# Patient Record
Sex: MALE | Race: WHITE | NOT HISPANIC OR LATINO | Employment: FULL TIME | URBAN - METROPOLITAN AREA
[De-identification: names, ages, dates, MRNs, and addresses within clinical notes are randomized per-mention and may not be internally consistent; named-entity substitution may affect disease eponyms.]

---

## 2018-04-27 ENCOUNTER — HOSPITAL ENCOUNTER (INPATIENT)
Facility: CLINIC | Age: 38
LOS: 2 days | Discharge: HOME | DRG: 392 | End: 2018-04-29
Attending: HOSPITALIST | Admitting: HOSPITALIST
Payer: COMMERCIAL

## 2018-04-27 ENCOUNTER — TRANSFERRED RECORDS (OUTPATIENT)
Dept: HEALTH INFORMATION MANAGEMENT | Facility: CLINIC | Age: 38
End: 2018-04-27

## 2018-04-27 DIAGNOSIS — K57.32 DIVERTICULITIS OF LARGE INTESTINE WITHOUT PERFORATION OR ABSCESS WITHOUT BLEEDING: Primary | ICD-10-CM

## 2018-04-27 PROCEDURE — 25800025 ZZH RX 258: Performed by: HOSPITALIST

## 2018-04-27 PROCEDURE — 25000128 H RX IP 250 OP 636: Performed by: HOSPITALIST

## 2018-04-27 PROCEDURE — 12000011 ZZH R&B MS OVERFLOW

## 2018-04-27 PROCEDURE — 99223 1ST HOSP IP/OBS HIGH 75: CPT | Mod: AI | Performed by: HOSPITALIST

## 2018-04-27 RX ORDER — ONDANSETRON 4 MG/1
4 TABLET, ORALLY DISINTEGRATING ORAL EVERY 6 HOURS PRN
Status: DISCONTINUED | OUTPATIENT
Start: 2018-04-27 | End: 2018-04-29 | Stop reason: HOSPADM

## 2018-04-27 RX ORDER — PROCHLORPERAZINE 25 MG
25 SUPPOSITORY, RECTAL RECTAL EVERY 12 HOURS PRN
Status: DISCONTINUED | OUTPATIENT
Start: 2018-04-27 | End: 2018-04-29 | Stop reason: HOSPADM

## 2018-04-27 RX ORDER — DEXTROSE MONOHYDRATE, SODIUM CHLORIDE, AND POTASSIUM CHLORIDE 50; 1.49; 4.5 G/1000ML; G/1000ML; G/1000ML
INJECTION, SOLUTION INTRAVENOUS CONTINUOUS
Status: DISCONTINUED | OUTPATIENT
Start: 2018-04-27 | End: 2018-04-29

## 2018-04-27 RX ORDER — POTASSIUM CHLORIDE 29.8 MG/ML
20 INJECTION INTRAVENOUS
Status: DISCONTINUED | OUTPATIENT
Start: 2018-04-27 | End: 2018-04-29 | Stop reason: HOSPADM

## 2018-04-27 RX ORDER — PROCHLORPERAZINE MALEATE 5 MG
10 TABLET ORAL EVERY 6 HOURS PRN
Status: DISCONTINUED | OUTPATIENT
Start: 2018-04-27 | End: 2018-04-29 | Stop reason: HOSPADM

## 2018-04-27 RX ORDER — ONDANSETRON 2 MG/ML
4 INJECTION INTRAMUSCULAR; INTRAVENOUS EVERY 6 HOURS PRN
Status: DISCONTINUED | OUTPATIENT
Start: 2018-04-27 | End: 2018-04-29 | Stop reason: HOSPADM

## 2018-04-27 RX ORDER — OXYCODONE HYDROCHLORIDE 5 MG/1
5-10 TABLET ORAL
Status: DISCONTINUED | OUTPATIENT
Start: 2018-04-27 | End: 2018-04-29 | Stop reason: HOSPADM

## 2018-04-27 RX ORDER — HYDROMORPHONE HYDROCHLORIDE 1 MG/ML
.3-.5 INJECTION, SOLUTION INTRAMUSCULAR; INTRAVENOUS; SUBCUTANEOUS
Status: DISCONTINUED | OUTPATIENT
Start: 2018-04-27 | End: 2018-04-29

## 2018-04-27 RX ORDER — POTASSIUM CHLORIDE 7.45 MG/ML
10 INJECTION INTRAVENOUS
Status: DISCONTINUED | OUTPATIENT
Start: 2018-04-27 | End: 2018-04-29 | Stop reason: HOSPADM

## 2018-04-27 RX ORDER — CIPROFLOXACIN 500 MG/1
500 TABLET, FILM COATED ORAL 2 TIMES DAILY
Status: ON HOLD | COMMUNITY
Start: 2018-04-24 | End: 2018-04-29

## 2018-04-27 RX ORDER — NALOXONE HYDROCHLORIDE 0.4 MG/ML
.1-.4 INJECTION, SOLUTION INTRAMUSCULAR; INTRAVENOUS; SUBCUTANEOUS
Status: DISCONTINUED | OUTPATIENT
Start: 2018-04-27 | End: 2018-04-29 | Stop reason: HOSPADM

## 2018-04-27 RX ORDER — METRONIDAZOLE 500 MG/1
500 TABLET ORAL 2 TIMES DAILY
Status: ON HOLD | COMMUNITY
Start: 2018-04-24 | End: 2018-04-29

## 2018-04-27 RX ORDER — POTASSIUM CHLORIDE 1500 MG/1
20-40 TABLET, EXTENDED RELEASE ORAL
Status: DISCONTINUED | OUTPATIENT
Start: 2018-04-27 | End: 2018-04-29 | Stop reason: HOSPADM

## 2018-04-27 RX ORDER — POTASSIUM CHLORIDE 1.5 G/1.58G
20-40 POWDER, FOR SOLUTION ORAL
Status: DISCONTINUED | OUTPATIENT
Start: 2018-04-27 | End: 2018-04-29 | Stop reason: HOSPADM

## 2018-04-27 RX ORDER — MAGNESIUM SULFATE HEPTAHYDRATE 40 MG/ML
4 INJECTION, SOLUTION INTRAVENOUS EVERY 4 HOURS PRN
Status: DISCONTINUED | OUTPATIENT
Start: 2018-04-27 | End: 2018-04-29 | Stop reason: HOSPADM

## 2018-04-27 RX ORDER — POTASSIUM CL/LIDO/0.9 % NACL 10MEQ/0.1L
10 INTRAVENOUS SOLUTION, PIGGYBACK (ML) INTRAVENOUS
Status: DISCONTINUED | OUTPATIENT
Start: 2018-04-27 | End: 2018-04-29 | Stop reason: HOSPADM

## 2018-04-27 RX ORDER — ACETAMINOPHEN 325 MG/1
650 TABLET ORAL EVERY 4 HOURS PRN
Status: DISCONTINUED | OUTPATIENT
Start: 2018-04-27 | End: 2018-04-29 | Stop reason: HOSPADM

## 2018-04-27 RX ADMIN — TAZOBACTAM SODIUM AND PIPERACILLIN SODIUM 3.38 G: 375; 3 INJECTION, SOLUTION INTRAVENOUS at 20:59

## 2018-04-27 RX ADMIN — POTASSIUM CHLORIDE, DEXTROSE MONOHYDRATE AND SODIUM CHLORIDE: 150; 5; 450 INJECTION, SOLUTION INTRAVENOUS at 20:59

## 2018-04-27 RX ADMIN — HYDROMORPHONE HYDROCHLORIDE 0.5 MG: 1 INJECTION, SOLUTION INTRAMUSCULAR; INTRAVENOUS; SUBCUTANEOUS at 23:14

## 2018-04-27 RX ADMIN — HYDROMORPHONE HYDROCHLORIDE 0.5 MG: 1 INJECTION, SOLUTION INTRAMUSCULAR; INTRAVENOUS; SUBCUTANEOUS at 21:14

## 2018-04-27 ASSESSMENT — ACTIVITIES OF DAILY LIVING (ADL)
FALL_HISTORY_WITHIN_LAST_SIX_MONTHS: NO
AMBULATION: 0-->INDEPENDENT
DRESS: 0-->INDEPENDENT
BATHING: 0-->INDEPENDENT
COGNITION: 0 - NO COGNITION ISSUES REPORTED
RETIRED_EATING: 0-->INDEPENDENT
SWALLOWING: 0-->SWALLOWS FOODS/LIQUIDS WITHOUT DIFFICULTY
TOILETING: 0-->INDEPENDENT
TRANSFERRING: 0-->INDEPENDENT
RETIRED_COMMUNICATION: 0-->UNDERSTANDS/COMMUNICATES WITHOUT DIFFICULTY

## 2018-04-27 NOTE — IP AVS SNAPSHOT
[facility]    [address]    [facility] [address]    Phone:  [phone]    Fax:  [phone]                                       After Visit Summary   4/27/2018    Pardeep Waite    MRN: [id number]           After Visit Summary Signature Page     I have received my discharge instructions, and my questions have been answered. I have discussed any challenges I see with this plan with the nurse or doctor.    ..........................................................................................................................................  Patient/Patient Representative Signature      ..........................................................................................................................................  Patient Representative Print Name and Relationship to Patient    ..................................................               ................................................  Date                                            Time    ..........................................................................................................................................  Reviewed by Signature/Title    ...................................................              ..............................................  Date                                                            Time

## 2018-04-27 NOTE — IP AVS SNAPSHOT
MRN:[id number]                      After Visit Summary   4/27/2018    Pardeep Waite    MRN: [id number]           Thank you!     Thank you for choosing [facility] for your care. Our goal is always to provide you with excellent care. Hearing back from our patients is one way we can continue to improve our services. Please take a few minutes to complete the written survey that you may receive in the mail after you visit. If you would like to speak to someone directly about your visit please contact Patient Relations at [phone]. Thank you!          Patient Information     Date Of Birth          1980        Designated Caregiver       Most Recent Value    Caregiver    Will someone help with your care after discharge? no      About your hospital stay     You were admitted on:  April 27, 2018 You last received care in the:  [facility]    You were discharged on:  April 29, 2018       Who to Call     For medical emergencies, please call 911.  For non-urgent questions about your medical care, please call your primary care provider or clinic, None          Attending Provider     Provider Specialty    Clyde Alvarez, DO Internal Medicine       Primary Care Provider Fax #    Physician No Ref-Primary [phone]      After Care Instructions     Activity       Your activity upon discharge: activity as tolerated and no driving while on analgesics            Diet       Follow this diet upon discharge: Orders Placed This Encounter      Low Fiber Diet                  Follow-up Appointments     Follow-up and recommended labs and tests        Follow up with primary care provider, Physician No Ref-Primary, within 7 days to evaluate medication change and for hospital follow- up.  No follow up labs or test are needed.  Follow up with colorectal surgery in 6-8 weeks to make arrangements for colonoscopy.   The patient was provided with a limited supply of oral narcotic  "medication, as it was indicated by their medical condition.  The patient was instructed not to take the pain medication above the prescribed dose and frequency due to the potential for addiction, respiratory depression, and even death. The patient expressed understanding of these instructions, is willing to accept these inherent risks, and verbally contracted not to misuse the medication.                  Pending Results     No orders found from 2018 to 2018.            Statement of Approval     Ordered          18 0822  I have reviewed and agree with all the recommendations and orders detailed in this document.  EFFECTIVE NOW     Approved and electronically signed by:  Clyde Alvarez DO             Admission Information     Date & Time Provider Department Dept. Phone    2018 Clyde Alvarez,  [facility] [phone]      Your Vitals Were     Blood Pressure Pulse Temperature Respirations Height Weight    124/63 80 98.1  F (36.7  C) (Oral) 16 1.854 m (6' 1\") 116.3 kg (256 lb 8 oz)    Pulse Oximetry BMI (Body Mass Index)                100% 33.84 kg/m2          [facility] Information     [facility] lets you send messages to your doctor, view your test results, renew your prescriptions, schedule appointments and more. To sign up, go to [email or web address] . Click on \"Log in\" on the left side of the screen, which will take you to the Welcome page. Then click on \"Sign up Now\" on the right side of the page.     You will be asked to enter the access code listed below, as well as some personal information. Please follow the directions to create your username and password.     Your access code is: [email or web address]  Expires: 2018  8:52 AM     Your access code will  in 90 days. If you need help or a new code, please call your [facility] or [phone].        Care EveryWhere ID     This is your Care EveryWhere ID. This could be used by other organizations to access your " [place] medical records  [id number]        Equal Access to Services     SANDRINENARINDER CHRISTY : Hadii aad ku hadsarojfaizan Soliliaali, waaxda luqadaha, qaybta kaalmada inocencioaleejesus, noah ruthpankajcarmelo love. So [facility] [phone].    ATENCIÓN: Si habla español, tiene a chavarria disposición servicios gratuitos de asistencia lingüística. Llame al [phone].    We comply with applicable federal civil rights laws and [place] laws. We do not discriminate on the basis of race, color, national origin, age, disability, sex, sexual orientation, or gender identity.               Review of your medicines      START taking        Dose / Directions    amoxicillin-clavulanate 875-125 MG per tablet   Commonly known as:  AUGMENTIN        Dose:  1 tablet   Take 1 tablet by mouth 2 times daily   Quantity:  20 tablet   Refills:  0       oxyCODONE IR 5 MG tablet   Commonly known as:  ROXICODONE        Dose:  5 mg   Take 1 tablet (5 mg) by mouth every 6 hours as needed for severe pain maximum 6 tablet(s) per day   Quantity:  12 tablet   Refills:  0         CONTINUE these medicines which have NOT CHANGED        Dose / Directions    etanercept 50 MG/ML injection   Commonly known as:  ENBREL        Inject Subcutaneous once a week   Refills:  0         STOP taking     CIPRO 500 MG tablet   Generic drug:  ciprofloxacin           FLAGYL 500 MG tablet   Generic drug:  metroNIDAZOLE                Where to get your medicines      These medications were sent to [facility] [address] [address]     Phone:  [phone]     amoxicillin-clavulanate 875-125 MG per tablet         Some of these will need a paper prescription and others can be bought over the counter. Ask your nurse if you have questions.     Bring a paper prescription for each of these medications     oxyCODONE IR 5 MG tablet                Protect others around you: Learn how to safely use, store and  throw away your medicines at www.disposemymeds.org.        ANTIBIOTIC INSTRUCTION     You've Been Prescribed an Antibiotic - Now What?  Your healthcare team thinks that you or your loved one might have an infection. Some infections can be treated with antibiotics, which are powerful, life-saving drugs. Like all medications, antibiotics have side effects and should only be used when necessary. There are some important things you should know about your antibiotic treatment.      Your healthcare team may run tests before you start taking an antibiotic.    Your team may take samples (e.g., from your blood, urine or other areas) to run tests to look for bacteria. These test can be important to determine if you need an antibiotic at all and, if you do, which antibiotic will work best.      Within a few days, your healthcare team might change or even stop your antibiotic.    Your team may start you on an antibiotic while they are working to find out what is making you sick.    Your team might change your antibiotic because test results show that a different antibiotic would be better to treat your infection.    In some cases, once your team has more information, they learn that you do not need an antibiotic at all. They may find out that you don't have an infection, or that the antibiotic you're taking won't work against your infection. For example, an infection caused by a virus can't be treated with antibiotics. Staying on an antibiotic when you don't need it is more likely to be harmful than helpful.      You may experience side effects from your antibiotic.    Like all medications, antibiotics have side effects. Some of these can be serious.    Let you healthcare team know if you have any known allergies when you are admitted to the hospital.    One significant side effect of nearly all antibiotics is the risk of severe and sometimes deadly diarrhea caused by Clostridium difficile (C. Difficile). This occurs when a  person takes antibiotics because some good germs are destroyed. Antibiotic use allows C. diificile to take over, putting patients at high risk for this serious infection.    As a patient or caregiver, it is important to understand your or your loved one's antibiotic treatment. It is especially important for caregivers to speak up when patients can't speak for themselves. Here are some important questions to ask your healthcare team.    What infection is this antibiotic treating and how do you know I have that infection?    What side effects might occur from this antibiotic?    How long will I need to take this antibiotic?    Is it safe to take this antibiotic with other medications or supplements (e.g., vitamins) that I am taking?     Are there any special directions I need to know about taking this antibiotic? For example, should I take it with food?    How will I be monitored to know whether my infection is responding to the antibiotic?    What tests may help to make sure the right antibiotic is prescribed for me?      Information provided by:  www.cdc.gov/getsmart  U.S. Department of Health and Human Services  Centers for disease Control and Prevention  National Center for Emerging and Zoonotic Infectious Diseases  Division of Healthcare Quality Promotion        Information about OPIOIDS     PRESCRIPTION OPIOIDS: WHAT YOU NEED TO KNOW   You have a prescription for an opioid (narcotic) pain medicine. Opioids can cause addiction. If you have a history of chemical dependency of any type, you are at a higher risk of becoming addicted to opioids. Only take this medicine after all other options have been tried. Take it for as short a time and as few doses as possible.     Do not:    Drive. If you drive while taking these medicines, you could be arrested for driving under the influence (DUI).    Operate heavy machinery    Do any other dangerous activities while taking these medicines.     Drink any alcohol while taking  these medicines.      Take with any other medicines that contain acetaminophen. Read all labels carefully. Look for the word  acetaminophen  or  Tylenol.  Ask your pharmacist if you have questions or are unsure.    Store your pills in a secure place, locked if possible. We will not replace any lost or stolen medicine. If you don t finish your medicine, please throw away (dispose) as directed by your pharmacist. The [place] Pollution Control Agency has more information about safe disposal: https://www.pca.[facility].us/living-green/managing-unwanted-medications    All opioids tend to cause constipation. Drink plenty of water and eat foods that have a lot of fiber, such as fruits, vegetables, prune juice, apple juice and high-fiber cereal. Take a laxative (Miralax, milk of magnesia, Colace, Senna) if you don t move your bowels at least every other day.              Medication List: This is a list of all your medications and when to take them. Check marks below indicate your daily home schedule. Keep this list as a reference.      Medications           Morning Afternoon Evening Bedtime As Needed    amoxicillin-clavulanate 875-125 MG per tablet   Commonly known as:  AUGMENTIN   Take 1 tablet by mouth 2 times daily                                etanercept 50 MG/ML injection   Commonly known as:  ENBREL   Inject Subcutaneous once a week                                oxyCODONE IR 5 MG tablet   Commonly known as:  ROXICODONE   Take 1 tablet (5 mg) by mouth every 6 hours as needed for severe pain maximum 6 tablet(s) per day   Last time this was given:  10 mg on 4/29/2018  1:12 AM

## 2018-04-28 LAB
ANION GAP SERPL CALCULATED.3IONS-SCNC: 7 MMOL/L (ref 3–14)
BUN SERPL-MCNC: 12 MG/DL (ref 7–30)
CALCIUM SERPL-MCNC: 8.8 MG/DL (ref 8.5–10.1)
CHLORIDE SERPL-SCNC: 102 MMOL/L (ref 94–109)
CO2 SERPL-SCNC: 28 MMOL/L (ref 20–32)
CREAT SERPL-MCNC: 0.93 MG/DL (ref 0.66–1.25)
ERYTHROCYTE [DISTWIDTH] IN BLOOD BY AUTOMATED COUNT: 11.9 % (ref 10–15)
GFR SERPL CREATININE-BSD FRML MDRD: >90 ML/MIN/1.7M2
GLUCOSE SERPL-MCNC: 107 MG/DL (ref 70–99)
HCT VFR BLD AUTO: 40.4 % (ref 40–53)
HGB BLD-MCNC: 13 G/DL (ref 13.3–17.7)
MAGNESIUM SERPL-MCNC: 2 MG/DL (ref 1.6–2.3)
MCH RBC QN AUTO: 27.8 PG (ref 26.5–33)
MCHC RBC AUTO-ENTMCNC: 32.2 G/DL (ref 31.5–36.5)
MCV RBC AUTO: 87 FL (ref 78–100)
PLATELET # BLD AUTO: 272 10E9/L (ref 150–450)
POTASSIUM SERPL-SCNC: 3.8 MMOL/L (ref 3.4–5.3)
RBC # BLD AUTO: 4.67 10E12/L (ref 4.4–5.9)
SODIUM SERPL-SCNC: 137 MMOL/L (ref 133–144)
WBC # BLD AUTO: 8.7 10E9/L (ref 4–11)

## 2018-04-28 PROCEDURE — 25000128 H RX IP 250 OP 636: Performed by: HOSPITALIST

## 2018-04-28 PROCEDURE — 25800025 ZZH RX 258: Performed by: HOSPITALIST

## 2018-04-28 PROCEDURE — 36415 COLL VENOUS BLD VENIPUNCTURE: CPT | Performed by: HOSPITALIST

## 2018-04-28 PROCEDURE — 85027 COMPLETE CBC AUTOMATED: CPT | Performed by: HOSPITALIST

## 2018-04-28 PROCEDURE — 83735 ASSAY OF MAGNESIUM: CPT | Performed by: HOSPITALIST

## 2018-04-28 PROCEDURE — 25000132 ZZH RX MED GY IP 250 OP 250 PS 637: Performed by: HOSPITALIST

## 2018-04-28 PROCEDURE — 80048 BASIC METABOLIC PNL TOTAL CA: CPT | Performed by: HOSPITALIST

## 2018-04-28 PROCEDURE — 99232 SBSQ HOSP IP/OBS MODERATE 35: CPT | Performed by: HOSPITALIST

## 2018-04-28 PROCEDURE — 12000011 ZZH R&B MS OVERFLOW

## 2018-04-28 RX ADMIN — OXYCODONE HYDROCHLORIDE 10 MG: 5 TABLET ORAL at 13:43

## 2018-04-28 RX ADMIN — POTASSIUM CHLORIDE, DEXTROSE MONOHYDRATE AND SODIUM CHLORIDE: 150; 5; 450 INJECTION, SOLUTION INTRAVENOUS at 08:01

## 2018-04-28 RX ADMIN — OXYCODONE HYDROCHLORIDE 10 MG: 5 TABLET ORAL at 20:14

## 2018-04-28 RX ADMIN — TAZOBACTAM SODIUM AND PIPERACILLIN SODIUM 3.38 G: 375; 3 INJECTION, SOLUTION INTRAVENOUS at 21:52

## 2018-04-28 RX ADMIN — TAZOBACTAM SODIUM AND PIPERACILLIN SODIUM 3.38 G: 375; 3 INJECTION, SOLUTION INTRAVENOUS at 15:11

## 2018-04-28 RX ADMIN — POTASSIUM CHLORIDE, DEXTROSE MONOHYDRATE AND SODIUM CHLORIDE: 150; 5; 450 INJECTION, SOLUTION INTRAVENOUS at 18:05

## 2018-04-28 RX ADMIN — TAZOBACTAM SODIUM AND PIPERACILLIN SODIUM 3.38 G: 375; 3 INJECTION, SOLUTION INTRAVENOUS at 09:43

## 2018-04-28 RX ADMIN — TAZOBACTAM SODIUM AND PIPERACILLIN SODIUM 3.38 G: 375; 3 INJECTION, SOLUTION INTRAVENOUS at 03:04

## 2018-04-28 RX ADMIN — ACETAMINOPHEN 650 MG: 325 TABLET ORAL at 17:00

## 2018-04-28 RX ADMIN — HYDROMORPHONE HYDROCHLORIDE 0.5 MG: 1 INJECTION, SOLUTION INTRAMUSCULAR; INTRAVENOUS; SUBCUTANEOUS at 08:01

## 2018-04-28 RX ADMIN — HYDROMORPHONE HYDROCHLORIDE 0.5 MG: 1 INJECTION, SOLUTION INTRAMUSCULAR; INTRAVENOUS; SUBCUTANEOUS at 10:30

## 2018-04-28 RX ADMIN — OXYCODONE HYDROCHLORIDE 10 MG: 5 TABLET ORAL at 17:00

## 2018-04-28 NOTE — PLAN OF CARE
Problem: Infection, Risk/Actual (Adult)  Goal: Identify Related Risk Factors and Signs and Symptoms  Related risk factors and signs and symptoms are identified upon initiation of Human Response Clinical Practice Guideline (CPG).   Outcome: No Change  R.N.  VSS, afebrile, resting well, had dilaudid x 1 at the beginning of the shift, lungs clear, positive bowel sounds, no nausea, will continue to monitor closely and provide for needs

## 2018-04-28 NOTE — PHARMACY-ADMISSION MEDICATION HISTORY
Admission medication history interview status for this patient is complete. See [facility] admission navigator for allergy information, prior to admission medications and immunization status.     Medication history interview source(s):Patient  Medication history resources (including written lists, pill bottles, clinic record):Care Everywhere  Primary pharmacy:[facility]    Changes made to PTA medication list:  Added: See list  Deleted: -  Changed: -    Actions taken by pharmacist (provider contacted, etc):None     Additional medication history information:None    Medication reconciliation/reorder completed by provider prior to medication history? No    Do you take OTC medications (eg tylenol, ibuprofen, fish oil, eye/ear drops, etc)? N    Prior to Admission medications    Medication Sig Last Dose Taking? Auth Provider   ciprofloxacin (CIPRO) 500 MG tablet Take 500 mg by mouth 2 times daily x10 days 4/27/2018 at am Yes Unknown, Entered By History   etanercept (ENBREL) 50 MG/ML injection Inject Subcutaneous once a week 4/25/2018 at Unknown time Yes Unknown, Entered By History   metroNIDAZOLE (FLAGYL) 500 MG tablet Take 500 mg by mouth 2 times daily x10 days 4/27/2018 at am Yes Unknown, Entered By History

## 2018-04-28 NOTE — PROGRESS NOTES
[facility]    Hospitalist Progress Note  Name: Pardeep Waite    MRN: [id number]  Provider:  Clyde Alvarez DO, MPH  Date of Service: 04/28/2018    Summary of Stay: Pardeep Waite is a 37 year old male with a history of psoriatic arthritis on Enbrel and previous sigmoid diverticulitis who presents with sigmoid diverticulitis with questionable early intramural abscess formation.     1.  Sigmoid diverticulitis: The CT scan report indicates a possible early intramural abscess formation although this is equivocal.  I have requested that the images be uploaded into our system for review.  Appreciate colorectal surgery consultation.  Evidently no surgical indication at this time.  Will advance diet as tolerated and he will need a colonoscopy and 6-8 weeks.  We will advance to full liquids. He currently appears stable and non-toxic and not septic.  We will continue with Zosyn and use Augmentin on discharge.     2.  Psoriatic arthritis: Appears stable.  Obviously be holding the Enbrel in the context of ongoing infection.    # Pain Assessment:  Current Pain Score 4/28/2018   Patient currently in pain? -   Pain score (0-10) 7   Pain location -   Pain descriptors -   - Pardeep is experiencing pain due to diverticulitis. Pain management was discussed and the plan was created in a collaborative fashion.  Pardeep's response to the current recommendations: engaged  - Please see the plan for pain management as documented above    DVT Prophylaxis: Pneumatic Compression Devices and Ambulate every shift  Code Status: Full Code  Disposition: Expected discharge in 1 days to home. Goals prior to discharge include continue IV antibiotics and advance diet.   Incidental Findings: None.  Family updated today: No     Interval History   The patient reports doing well. No chest pain or shortness of breath. No nausea, vomiting, diarrhea, constipation. No fevers. Pain improved. No other specific complaints identified.     -Data reviewed today:  I personally reviewed all new labs and imaging results over the last 24 hours.     Physical Exam   Temp: 98.4  F (36.9  C) Temp src: Oral BP: 121/66   Heart Rate: 97 Resp: 14 SpO2: 96 % O2 Device: None (Room air)    Vitals:    04/27/18 2023   Weight: 116.3 kg (256 lb 8 oz)     Vital Signs with Ranges  Temp:  [98.4  F (36.9  C)-99.8  F (37.7  C)] 98.4  F (36.9  C)  Heart Rate:  [92-97] 97  Resp:  [14-18] 14  BP: (116-130)/(57-73) 121/66  Cuff Mean (mmHg):  [85-97] 85  SpO2:  [96 %] 96 %  I/O last 3 completed shifts:  In: 852 [P.O.:50; I.V.:802]  Out: -     GENERAL: No apparent distress. Awake, alert, and fully oriented.  HEENT: Normocephalic, atraumatic. Extraocular movements intact.  CARDIOVASCULAR: Regular rate and rhythm without murmurs or rubs. No S3.  PULMONARY: Clear bilaterally.  GASTROINTESTINAL: Soft, mild LLQ tenderness, non-distended. Bowel sounds normoactive.   EXTREMITIES: No cyanosis or clubbing. No edema.  NEUROLOGICAL: CN 2-12 grossly intact, no focal neurological deficits.  DERMATOLOGICAL: No rash, ulcer, bruising, nor jaundice.     Medications     dextrose 5% and 0.45% NaCl + KCl 20 mEq/L 100 mL/hr at 04/28/18 0801       piperacillin-tazobactam  3.375 g Intravenous Q6H     Data     Laboratory:    Recent Labs  Lab 04/28/18  0554   WBC 8.7   HGB 13.0*   HCT 40.4   MCV 87          Recent Labs  Lab 04/28/18  0554      POTASSIUM 3.8   CHLORIDE 102   CO2 28   ANIONGAP 7   *   BUN 12   CR 0.93   GFRESTIMATED >90   GFRESTBLACK >90   SERENE 8.8     No results for input(s): CULT in the last 168 hours.    Imaging:  No results found for this or any previous visit (from the past 24 hour(s)).      Clyde Alvarez DO MPH  [facility] Hospital[facility]  [address] [place] [address]  Pager: [phone]  04/28/2018

## 2018-04-28 NOTE — PLAN OF CARE
Problem: Patient Care Overview  Goal: Plan of Care/Patient Progress Review  Outcome: Improving  Zackary full liquid diet.  Pain max 8/10; better and longer pain relief with oxycodone.  BS hyperactive.  Small stool early am.  Up ambulating. Cont with plan of care.

## 2018-04-28 NOTE — PLAN OF CARE
Problem: Patient Care Overview  Goal: Plan of Care/Patient Progress Review  Outcome: No Change  Patient arrived as direct admit from [facility]. Report taken from MERE Kelly. Patient arrived in ambulatory state, alone by private vehicle. Temp max 99.8. VSS. Pain controlled with IV Dilaudid. IV Zosyn given. Continuous IV fluids initiated. Patient ambulating independently. Patient showered. Hyperactive BS. Rounded abdomen. Denies abdominal tenderness, nausea, vomiting, and diarrhea. Pain in left lower quadrant. Tolerating clear liquid diet. Colorectal surgery to see patient. Labs to be drawn in AM. Patient currently resting comfortably. Will continue to monitor and provide for cares.

## 2018-04-28 NOTE — H&P
[facility]  Hospitalist H&P    Name: Pardeep Waite      MRN: [id number]  YOB: 1980    Age: 37 year old  Date of admission: 4/27/2018  Primary care provider: No Ref-Primary, Physician            Assessment and Plan:   Pardeep Waite is a 37 year old male with a history of psoriatic arthritis on Enbrel and previous sigmoid diverticulitis who presents with sigmoid diverticulitis with questionable early intramural abscess formation.    1.  Sigmoid diverticulitis: Again the CT scan report indicates a possible early intramural abscess formation although this is equivocal.  I have requested that the edges be sent to our hospital and scanned into the system for review.  I would like to request a colorectal surgery consultation given the recurrent diverticulitis as well as the abnormal CT scan.  He currently appears stable and non-toxic and not septic.  We will proceed with Zosyn for antibiotics given that he is artery been on ciprofloxacin and Flagyl for several days without benefit.    2.  Psoriatic arthritis: Appears stable.  Obviously be holding the Enbrel in the context of ongoing infection.    Code status: Full.  Admit to inpatient status.  Prophylaxis: Pneumatic compression devices.  Disposition: Likely 2 days.            Chief Complaint:   Diverticulitis         History of Present Illness:   Pardeep Waite is a 37 year old male who presents with diverticulitis.  I discussed the case with the [facility] urgent care physician and history is also obtained from the patient at the bedside.  He has a history of diverticulitis from about 4 years ago treated at this hospital.  He was quite ill during that hospitalization with sepsis as a result of his diverticulitis.  He was seen in consultation by general surgery but no surgical intervention was performed.  CT scan showed a contained perforation at that time.  He was treated with ciprofloxacin and Flagyl and discharged with these medications  orally.  He has done well since that time.  Last week and he noticed some left lower quadrant abdominal pain.  This became worse on Monday so he went to urgent care on Tuesday.  He was clinically diagnosed with diverticulitis and given oral ciprofloxacin and Flagyl.  His pain is persisted since that time and he was again evaluated today.  He denies any fevers but reports some chills.  He has been eating quite poorly as a result of his abdominal pain.  Workup in the urgent care shows a white blood cell count of 11 and CT scan was performed showing proximal sigmoid diverticulitis in a similar pattern to prior study with a question of a small early intramural abscess formation although this is equivocal.  No extraluminal findings noted.  The patient was transferred for direct admission to the hospital.            Past Medical History:   Psoriatic arthritis  Diverticulitis          Past Surgical History:   No past surgical history on file.          Social History:     Social History   Substance Use Topics     Smoking status: Current Some Day Smoker     Smokeless tobacco: Not on file     Alcohol use Yes             Family History:   The family history was fully reviewed and non-contributory in this case.         Allergies:   No Known Allergies          Medications:     Prior to Admission medications    Not on File             Review of Systems:   A Comprehensive greater than 10 system review of systems was carried out.  Pertinent positives and negatives are noted above.  Otherwise negative for contributory information.           Physical Exam:   There were no vitals taken for this visit.  Wt Readings from Last 1 Encounters:   12/23/16 117.5 kg (259 lb)     Exam:  GENERAL: No apparent distress. Awake, alert, and fully oriented.  HEENT: Normocephalic, atraumatic. Extraocular movements intact.  CARDIOVASCULAR: Regular rate and rhythm without murmurs or rubs. No S3.  PULMONARY: Clear to auscultation bilaterally.  ABDOMINAL:  Soft, mildly tender LLQ, non-distended. Bowel sounds normoactive.   EXTREMITIES: No cyanosis or clubbing. No appreciable edema.  NEUROLOGICAL: CN 2-12 grossly intact, no focal neurological deficits.  DERMATOLOGICAL: No rash, ulcer, bruising, nor jaundice.          Data:   Laboratory:  WBC 11 otherwise CBC and BMP normal.  No results for input(s): CULT in the last 168 hours.    Imaging:  No results found for this or any previous visit (from the past 24 hour(s)).   See above.    Clyde Alvarez DO MPH  [facility] Hospitalist  [address] [facility] [place] [address]  Pager: [phone]  04/27/2018

## 2018-04-29 VITALS
TEMPERATURE: 98.1 F | BODY MASS INDEX: 33.6 KG/M2 | HEIGHT: 73 IN | RESPIRATION RATE: 16 BRPM | WEIGHT: 253.53 LBS | OXYGEN SATURATION: 96 % | SYSTOLIC BLOOD PRESSURE: 119 MMHG | DIASTOLIC BLOOD PRESSURE: 80 MMHG | HEART RATE: 64 BPM

## 2018-04-29 PROCEDURE — 25800025 ZZH RX 258: Performed by: HOSPITALIST

## 2018-04-29 PROCEDURE — 25000132 ZZH RX MED GY IP 250 OP 250 PS 637: Performed by: HOSPITALIST

## 2018-04-29 PROCEDURE — 25000128 H RX IP 250 OP 636: Performed by: HOSPITALIST

## 2018-04-29 PROCEDURE — 99239 HOSP IP/OBS DSCHRG MGMT >30: CPT | Performed by: HOSPITALIST

## 2018-04-29 RX ORDER — OXYCODONE HYDROCHLORIDE 5 MG/1
5 TABLET ORAL EVERY 6 HOURS PRN
Qty: 12 TABLET | Refills: 0 | Status: SHIPPED | OUTPATIENT
Start: 2018-04-29 | End: 2020-08-20

## 2018-04-29 RX ADMIN — POTASSIUM CHLORIDE, DEXTROSE MONOHYDRATE AND SODIUM CHLORIDE: 150; 5; 450 INJECTION, SOLUTION INTRAVENOUS at 03:56

## 2018-04-29 RX ADMIN — OXYCODONE HYDROCHLORIDE 10 MG: 5 TABLET ORAL at 09:06

## 2018-04-29 RX ADMIN — TAZOBACTAM SODIUM AND PIPERACILLIN SODIUM 3.38 G: 375; 3 INJECTION, SOLUTION INTRAVENOUS at 09:08

## 2018-04-29 RX ADMIN — ACETAMINOPHEN 650 MG: 325 TABLET ORAL at 09:06

## 2018-04-29 RX ADMIN — ACETAMINOPHEN 650 MG: 325 TABLET ORAL at 01:12

## 2018-04-29 RX ADMIN — OXYCODONE HYDROCHLORIDE 10 MG: 5 TABLET ORAL at 01:12

## 2018-04-29 RX ADMIN — TAZOBACTAM SODIUM AND PIPERACILLIN SODIUM 3.38 G: 375; 3 INJECTION, SOLUTION INTRAVENOUS at 03:01

## 2018-04-29 NOTE — PLAN OF CARE
Problem: Infection, Risk/Actual (Adult)  Goal: Identify Related Risk Factors and Signs and Symptoms  Related risk factors and signs and symptoms are identified upon initiation of Human Response Clinical Practice Guideline (CPG).   Outcome: Improving  R.N.  VSS, afebrile, resting well, oxy and tylenol taken x 1 for pain, states that he feels much better, passing flatus but no stool this shift.  Positive bowel sounds, no nausea, zosyn continues as ordered, will monitor closely and provide for needs

## 2018-04-29 NOTE — PLAN OF CARE
Problem: Patient Care Overview  Goal: Plan of Care/Patient Progress Review  Outcome: Therapy, progress toward functional goals as expected  Pt tolerating full liquid diet without increased nausea or pain, pain controlled with heating pad, po analgesics and ambulation, denies passing flatus, continue to monitor and provide for needs.

## 2018-04-29 NOTE — PROGRESS NOTES
Colorectal Surgery Progress Note    Interval History:  No acute events overnight. Feels much better.  Frustrated that he has gotten a second bout despite worling on his diet.    Physical Exam:   Temp:  [98.1  F (36.7  C)-98.5  F (36.9  C)] 98.1  F (36.7  C)  Pulse:  [80] 80  Heart Rate:  [98] 98  Resp:  [14-16] 16  BP: (119-124)/(63-80) 119/80  Cuff Mean (mmHg):  [93] 93  SpO2:  [98 %-100 %] 100 %  General: Alert, oriented, appears comfortable, NAD.  Respiratory: breathing non labored  Abdomen: Abdomen is soft, nontender, nondistended.    Data:   All laboratory and imaging data in the past 24 hours reviewed  I/O last 3 completed shifts:  In: 4741 [P.O.:2650; I.V.:2091]  Out: -   Recent Labs   Lab Test  04/28/18   0554  12/23/16   1529  06/28/14   0647   WBC  8.7  9.5  10.3   HGB  13.0*  14.5  12.1*   PLT  272  242  192      Recent Labs   Lab Test  04/28/18   0554  06/28/14   0647  06/27/14   1820   NA  137  140  139   POTASSIUM  3.8  4.0  3.9   CHLORIDE  102  106  100   CO2  28  26  24   BUN  12  16  16   CR  0.93  0.90  0.83   ANIONGAP  7  8  14   SERENE  8.8  8.3*  9.4   GLC  107*  94  104*        Recent Labs   Lab Test  06/28/14   0647   PROTTOTAL  6.4*   ALBUMIN  3.4*   BILITOTAL  0.9   ALKPHOS  111   AST  26   ALT  41       Assessment and Plan:     Improving diverticulitis.  Agree advancing to low residue diet.  OK with discharge if tolerates diet advancement.  My office will contact him to set follow up colonoscopy.  We discussed once he recovers adding a second dose of Metamucil to help overcome his high protein diet which may contribute.  Typically I would recommend 10 days Augmentin for antibiotics.    Rhea Sequeira MD   Colorectal Surgery  [phone] [facility]  [phone] Office/Call service

## 2018-04-29 NOTE — PROGRESS NOTES
CM contacted by nursing staff re: resources for PCP and f/u with [facility].     Printed off information on [facility] (pt's preference) with information on providers. Also printed off resources for [facility] general information, specific information on closest clinic ([facility]) and limited provider information.     Met with pt at bedside and discussed steps he needs to take to follow through on establishing with a PCP & setting up f/u appt with [facility].     No additional needs at this time.     Becca Almazan, RN, BSN, CTS  [facility]  [phone]

## 2018-04-29 NOTE — PLAN OF CARE
Problem: Patient Care Overview  Goal: Plan of Care/Patient Progress Review  Outcome: Adequate for Discharge Date Met: 04/29/18  Oxycodone and tylenol adequate for pain relief at 0900.  Pt wanting to drive self home; md notified.  Per md pt not to drive unitl 6-8 hrs after oxycodone; pt agreed at will stay until 1500.  Zackary low fiber diet. Reviewed discharge meds and instructions with patient; care coordinator provided info setting up with a pmd and gastroenterolgy.

## 2018-04-29 NOTE — DISCHARGE SUMMARY
"Hospitalist Discharge Summary  [facility]    Pardeep Waite MRN# [id number]   YOB: 1980 Age: 37 year old     Date of Admission:  4/27/2018  Date of Discharge:  4/29/2018  Admitting Physician:  Clyde Alvarez DO  Discharge Physician:  Clyde Alvarez  Discharging Service:  Hospitalist     Primary Provider: No Ref-Primary, Physician          Discharge Diagnosis:   Recurrent sigmoid diverticulitis             Discharge Disposition:   Discharged to home           Allergies:   No Known Allergies           Discharge Medications:   Current Discharge Medication List      START taking these medications    Details   amoxicillin-clavulanate (AUGMENTIN) 875-125 MG per tablet Take 1 tablet by mouth 2 times daily  Qty: 20 tablet, Refills: 0    Associated Diagnoses: Diverticulitis of large intestine without perforation or abscess without bleeding      oxyCODONE IR (ROXICODONE) 5 MG tablet Take 1 tablet (5 mg) by mouth every 6 hours as needed for severe pain maximum 6 tablet(s) per day  Qty: 12 tablet, Refills: 0    Associated Diagnoses: Diverticulitis of large intestine without perforation or abscess without bleeding         CONTINUE these medications which have NOT CHANGED    Details   etanercept (ENBREL) 50 MG/ML injection Inject Subcutaneous once a week         STOP taking these medications       ciprofloxacin (CIPRO) 500 MG tablet Comments:   Reason for Stopping:         metroNIDAZOLE (FLAGYL) 500 MG tablet Comments:   Reason for Stopping:                      Condition on Discharge:   Discharge condition: Stable   Discharge vitals: Blood pressure 124/63, pulse 80, temperature 98.4  F (36.9  C), temperature source Oral, resp. rate 16, height 1.854 m (6' 1\"), weight 116.3 kg (256 lb 8 oz), SpO2 98 %.   Code status on discharge: Full Code      BASIC PHYSICAL EXAMINATION:  GENERAL: No apparent distress.  CARDIOVASCULAR: Regular rate and rhythm without murmurs.  PULMONARY: Clear to auscultation " bilaterally.   GASTROINTESTINAL: Abdomen soft, non-tender.  EXTREMITIES: No edema, pulses intact.  NEUROLOGIC: No focal deficits.     # Discharge Pain Plan:   - During his hospitalization, Pardeep experienced pain due to acute sigmoid diverticulitis.  The pain plan for discharge was discussed with Pardeep and the plan was created in a collaborative fashion.    - Opioids prescribed on discharge: Oxycodone  - Duration of opioids after discharge: Per [facility] opioid prescribing guidelines, a 3 day prescription of opioids was provided.  - Bowel regimen: not needed           History of Illness:   See detailed admission note for full details.               Procedures excluding imaging which is summarized below:   Please see details in the electronic medical record.           Consultations:   COLORECTAL SURGERY IP CONSULT          Significant Results:   Results for orders placed or performed in visit on 12/23/16   XR Chest 2 Views    Narrative    XR CHEST 2 VW 12/23/2016 4:31 PM     HISTORY: Fever, unspecified      Impression    IMPRESSION: Negative exam.    DARIANA MANJARREZ MD       Transthoracic Echocardiogram Results:  No results found for this or any previous visit (from the past 4320 hour(s)).             Pending Results:   Unresulted Labs Ordered in the Past 30 Days of this Admission     No orders found from 2/26/2018 to 4/28/2018.                      Discharge Instructions and Follow-Up:   Discharge instructions and follow-up:   Discharge Procedure Orders  Follow-up and recommended labs and tests    Order Comments: Follow up with primary care provider, Physician No Ref-Primary, within 7 days to evaluate medication change and for hospital follow- up.  No follow up labs or test are needed.  Follow up with colorectal surgery in 6-8 weeks to make arrangements for colonoscopy.   The patient was provided with a limited supply of oral narcotic medication, as it was indicated by their medical condition.  The patient was instructed  not to take the pain medication above the prescribed dose and frequency due to the potential for addiction, respiratory depression, and even death. The patient expressed understanding of these instructions, is willing to accept these inherent risks, and verbally contracted not to misuse the medication.     Activity   Order Comments: Your activity upon discharge: activity as tolerated and no driving while on analgesics   Order Specific Question Answer Comments   Is discharge order? Yes      Full Code     Diet   Order Comments: Follow this diet upon discharge: Orders Placed This Encounter     Low Fiber Diet   Order Specific Question Answer Comments   Is discharge order? Yes                Hospital Course:   Pardeep Waite is a 37 year old male with a history of psoriatic arthritis on Enbrel and previous sigmoid diverticulitis who presented to direct admission from urgent care with sigmoid diverticulitis with questionable early intramural abscess formation.  The CT scan report indicates a possible early intramural abscess formation although this is equivocal.  I have requested that the images be uploaded into our system for review.    He was seen by colorectal surgery in consultation.  Evidently no surgical indication at this time.  His diet has been advanced and is now tolerating a low residue diet.  He will need a colonoscopy and 6-8 weeks.  He currently appears stable and non-toxic and not septic.  He was on Flagyl and ciprofloxacin orally for about 4 days prior to admission and was on Zosyn during hospitalization.  He has no fever and his abdominal pain has improved considerably.  Will complete treatment with 10 more days of Augmentin.  Colorectal surgery will make arrangements following discharge to see him in the clinic and complete a colonoscopy.    The patient was seen, examined, and counseled on this day. The hospitalization and plan of care was reviewed with the patient extensively. All questions were  addressed and the patient agreed to follow-up as noted above.      Total time spent in face to face contact with the patient and coordinating discharge was:  35 Minutes    Clyde Alvarez DO MPH  [facility] Hospitalist  [address] [place] [address]  Pager: [phone]  04/29/2018

## 2019-04-05 ENCOUNTER — HOSPITAL ENCOUNTER (EMERGENCY)
Facility: CLINIC | Age: 39
Discharge: HOME | End: 2019-04-06
Attending: EMERGENCY MEDICINE | Admitting: EMERGENCY MEDICINE
Payer: COMMERCIAL

## 2019-04-05 DIAGNOSIS — T43.611A ACCIDENTAL CAFFEINE OVERDOSE, INITIAL ENCOUNTER (H): ICD-10-CM

## 2019-04-05 DIAGNOSIS — F41.9 ANXIETY: ICD-10-CM

## 2019-04-05 PROCEDURE — 99285 EMERGENCY DEPT VISIT HI MDM: CPT | Mod: 25

## 2019-04-05 NOTE — ED AVS SNAPSHOT
[facility] Emergency Department  [address]  [facility] [address]  Phone:  [phone]  Fax:  [phone]                                    Pardeep Waite   MRN: [id number]    Department:  [facility] Emergency Department   Date of Visit:  4/5/2019           After Visit Summary Signature Page    I have received my discharge instructions, and my questions have been answered. I have discussed any challenges I see with this plan with the nurse or doctor.    ..........................................................................................................................................  Patient/Patient Representative Signature      ..........................................................................................................................................  Patient Representative Print Name and Relationship to Patient    ..................................................               ................................................  Date                                   Time    ..........................................................................................................................................  Reviewed by Signature/Title    ...................................................              ..............................................  Date                                               Time          22EPIC Rev 08/18

## 2019-04-06 VITALS
OXYGEN SATURATION: 97 % | DIASTOLIC BLOOD PRESSURE: 80 MMHG | TEMPERATURE: 98 F | HEART RATE: 91 BPM | SYSTOLIC BLOOD PRESSURE: 130 MMHG | RESPIRATION RATE: 16 BRPM

## 2019-04-06 LAB
ALBUMIN SERPL-MCNC: 3.9 G/DL (ref 3.4–5)
ALP SERPL-CCNC: 81 U/L (ref 40–150)
ALT SERPL W P-5'-P-CCNC: 49 U/L (ref 0–70)
ANION GAP SERPL CALCULATED.3IONS-SCNC: 6 MMOL/L (ref 3–14)
AST SERPL W P-5'-P-CCNC: 26 U/L (ref 0–45)
BASOPHILS # BLD AUTO: 0 10E9/L (ref 0–0.2)
BASOPHILS NFR BLD AUTO: 0.4 %
BILIRUB SERPL-MCNC: 0.3 MG/DL (ref 0.2–1.3)
BUN SERPL-MCNC: 15 MG/DL (ref 7–30)
CALCIUM SERPL-MCNC: 8.6 MG/DL (ref 8.5–10.1)
CHLORIDE SERPL-SCNC: 103 MMOL/L (ref 94–109)
CO2 SERPL-SCNC: 26 MMOL/L (ref 20–32)
CREAT SERPL-MCNC: 0.75 MG/DL (ref 0.66–1.25)
DIFFERENTIAL METHOD BLD: NORMAL
EOSINOPHIL # BLD AUTO: 0.1 10E9/L (ref 0–0.7)
EOSINOPHIL NFR BLD AUTO: 1.6 %
ERYTHROCYTE [DISTWIDTH] IN BLOOD BY AUTOMATED COUNT: 12.5 % (ref 10–15)
ETHANOL SERPL-MCNC: <0.01 G/DL
GFR SERPL CREATININE-BSD FRML MDRD: >90 ML/MIN/{1.73_M2}
GLUCOSE SERPL-MCNC: 135 MG/DL (ref 70–99)
HCT VFR BLD AUTO: 40.5 % (ref 40–53)
HGB BLD-MCNC: 13.5 G/DL (ref 13.3–17.7)
IMM GRANULOCYTES # BLD: 0.1 10E9/L (ref 0–0.4)
IMM GRANULOCYTES NFR BLD: 1.2 %
LYMPHOCYTES # BLD AUTO: 1.7 10E9/L (ref 0.8–5.3)
LYMPHOCYTES NFR BLD AUTO: 24 %
MCH RBC QN AUTO: 28.2 PG (ref 26.5–33)
MCHC RBC AUTO-ENTMCNC: 33.3 G/DL (ref 31.5–36.5)
MCV RBC AUTO: 85 FL (ref 78–100)
MONOCYTES # BLD AUTO: 0.8 10E9/L (ref 0–1.3)
MONOCYTES NFR BLD AUTO: 11 %
NEUTROPHILS # BLD AUTO: 4.3 10E9/L (ref 1.6–8.3)
NEUTROPHILS NFR BLD AUTO: 61.8 %
NRBC # BLD AUTO: 0 10*3/UL
NRBC BLD AUTO-RTO: 0 /100
PLATELET # BLD AUTO: 233 10E9/L (ref 150–450)
POTASSIUM SERPL-SCNC: 3.5 MMOL/L (ref 3.4–5.3)
PROT SERPL-MCNC: 7.4 G/DL (ref 6.8–8.8)
RBC # BLD AUTO: 4.78 10E12/L (ref 4.4–5.9)
SODIUM SERPL-SCNC: 135 MMOL/L (ref 133–144)
WBC # BLD AUTO: 6.9 10E9/L (ref 4–11)

## 2019-04-06 PROCEDURE — 80320 DRUG SCREEN QUANTALCOHOLS: CPT | Performed by: EMERGENCY MEDICINE

## 2019-04-06 PROCEDURE — 96375 TX/PRO/DX INJ NEW DRUG ADDON: CPT

## 2019-04-06 PROCEDURE — 85025 COMPLETE CBC W/AUTO DIFF WBC: CPT | Performed by: EMERGENCY MEDICINE

## 2019-04-06 PROCEDURE — 96361 HYDRATE IV INFUSION ADD-ON: CPT

## 2019-04-06 PROCEDURE — 96374 THER/PROPH/DIAG INJ IV PUSH: CPT

## 2019-04-06 PROCEDURE — 80053 COMPREHEN METABOLIC PANEL: CPT | Performed by: EMERGENCY MEDICINE

## 2019-04-06 PROCEDURE — 93005 ELECTROCARDIOGRAM TRACING: CPT

## 2019-04-06 PROCEDURE — 25000128 H RX IP 250 OP 636: Performed by: EMERGENCY MEDICINE

## 2019-04-06 RX ORDER — SODIUM CHLORIDE 9 MG/ML
1000 INJECTION, SOLUTION INTRAVENOUS CONTINUOUS
Status: DISCONTINUED | OUTPATIENT
Start: 2019-04-06 | End: 2019-04-06 | Stop reason: HOSPADM

## 2019-04-06 RX ORDER — LORAZEPAM 2 MG/ML
1 INJECTION INTRAMUSCULAR ONCE
Status: COMPLETED | OUTPATIENT
Start: 2019-04-06 | End: 2019-04-06

## 2019-04-06 RX ORDER — ONDANSETRON 4 MG/1
4 TABLET, ORALLY DISINTEGRATING ORAL EVERY 8 HOURS PRN
Qty: 10 TABLET | Refills: 0 | Status: SHIPPED | OUTPATIENT
Start: 2019-04-06 | End: 2019-04-09

## 2019-04-06 RX ORDER — ONDANSETRON 2 MG/ML
4 INJECTION INTRAMUSCULAR; INTRAVENOUS EVERY 30 MIN PRN
Status: DISCONTINUED | OUTPATIENT
Start: 2019-04-06 | End: 2019-04-06 | Stop reason: HOSPADM

## 2019-04-06 RX ADMIN — LORAZEPAM 1 MG: 2 INJECTION INTRAMUSCULAR; INTRAVENOUS at 00:48

## 2019-04-06 RX ADMIN — ONDANSETRON 4 MG: 2 INJECTION INTRAMUSCULAR; INTRAVENOUS at 00:48

## 2019-04-06 RX ADMIN — SODIUM CHLORIDE 1000 ML: 9 INJECTION, SOLUTION INTRAVENOUS at 00:48

## 2019-04-06 ASSESSMENT — ENCOUNTER SYMPTOMS
FEVER: 0
TREMORS: 1
NAUSEA: 1
VOMITING: 1

## 2019-04-06 NOTE — ED TRIAGE NOTES
Pt states he consumed multiple glasses of coffee and also Red Bulls. States he know he is dehydrated and became shaky. States then started with vomiting. States this has happened to him in the past requiring IV hydration.

## 2019-04-06 NOTE — ED PROVIDER NOTES
History     Chief Complaint:  Vomiting      HPI   Pardeep Waite is a 38 year old male with a past medical history significant for anxiety who presents with vomiting and tremors. Per the patient's report, he woke up early this morning and has been drinking a lot of coffee and Red Bull energy drinks throughout the day. Tonight, the patient and his family were at a birthday party and he had several vodka Red Bull drinks. He reports that when he was climbing into bed around 9:30 PM he felt nauseous and began shaking. Patient subsequently went to the bathroom and forced himself to vomit. He has only had once episode of emesis but mentions that when he has a lot of caffeine he begins to shake uncontrollably. Patient also mentions that he had several near syncopal episodes. Denies fevers. Patient mentions that he does not drink enough water on a daily basis and thinks that he is dehydrated. Of note, patient states that he has had similar symptoms in the past which required several saline boluses.       Allergies:  No known drug allergies     Medications:    Enbrel     Past Medical History:    Arthritis    Past Surgical History:    History reviewed. No pertinent surgical history.    Family History:    History reviewed. No pertinent family history.     Social History:  Smoking status: Current every day smoker  Alcohol use: Yes  Marital Status:   [2]       Review of Systems   Constitutional: Negative for fever.   Gastrointestinal: Positive for nausea and vomiting.   Neurological: Positive for tremors.   All other systems reviewed and are negative.        Physical Exam     Patient Vitals for the past 24 hrs:   BP Temp Temp src Pulse Heart Rate Resp SpO2   04/06/19 0130 -- -- -- -- -- -- 97 %   04/06/19 0115 130/80 -- -- 91 -- -- --   04/06/19 0100 126/84 -- -- -- -- -- --   04/05/19 2350 (!) 145/91 98  F (36.7  C) Temporal -- 88 16 99 %         Physical Exam  Constitutional:  Oriented to person, place, and time.  Anxious appearing   HENT:   Head:    Normocephalic.   Mouth/Throat:   Oropharynx is clear and moist.   Eyes:    EOM are normal. Pupils are equal, round, and reactive to light.   Neck:    Neck supple.   Cardiovascular:  Normal rate, regular rhythm and normal heart sounds.      Exam reveals no gallop and no friction rub.       No murmur heard.  Pulmonary/Chest:  Effort normal and breath sounds normal.      No respiratory distress. No wheezes. No rales.      No reproducible chest wall pain.  Abdominal:   Soft. No distension. No tenderness. No rebound and no guarding.   Musculoskeletal:  Normal range of motion.   Neurological:   Alert and oriented to person, place, and time.           Moves all 4 extremities spontaneously    Skin:    No rash noted. No pallor.       Emergency Department Course   ECG (00:34:19):  Rate 80 bpm. NM interval 136. QRS duration 104. QT/QTc 392/452. P-R-T axes 58 28 56. Normal sinus rhythm, Normal ECG,  Interpreted at 0034 by Piyush Burger MD.    Laboratory:  Alcohol level blood: <0.01  CBC: WNL (WBC 6.9, HGB 13.5, )  CMP: Glucose 135, (Creatinine 0.75)    Interventions:  0048: Ativan 1 mg IV  0231: NaCl bolus 733 ml IV  0048: Zofran 4 mg IV    Emergency Department Course:  Past medical records, nursing notes, and vitals reviewed.  2359: I performed an exam of the patient and obtained history, as documented above.    IV inserted and blood drawn.    0109: I rechecked the patient. The patient's shaking has resolved and feels improved.    0136: I rechecked the patient. Findings and plan explained to the Patient. Patient discharged home with instructions regarding supportive care, medications, and reasons to return. The importance of close follow-up was reviewed.       Impression & Plan      Medical Decision Making:  Mr. Waite is a 38 year old male that came in for shaking, anxiety after consuming high levels of caffeine, RedBull energy drink. Lab work is otherwise reassuring after IV  fluids. After Ativan his symptoms greatly improved. He is otherwise safe and appropriate for discharge and told to follow up with his primary doctor with new or worsening symptoms.       Diagnosis:    ICD-10-CM    1. Accidental caffeine overdose, initial encounter (H) T43.611A    2. Anxiety F41.9        Disposition:  discharged to home    Discharge Medications:     Medication List      Started    ondansetron 4 MG ODT tab  Commonly known as:  ZOFRAN ODT  4 mg, Oral, EVERY 8 HOURS PRN              Levi Elise  4/5/2019   [facility] EMERGENCY DEPARTMENT    Scribe Disclosure:  I, Levi Elise, am serving as a scribe at 11:59 PM on 4/5/2019 to document services personally performed by Piyush Burger MD based on my observations and the provider's statements to me.        Piyush Burger MD  04/06/19 0407

## 2019-04-06 NOTE — ED NOTES
Pt verbalizes understanding of proper oral rehydration, follow up and s/s to return to ER. Pt reports feeling improved on departure and ambulating well

## 2019-04-07 LAB — INTERPRETATION ECG - MUSE: NORMAL

## 2019-06-20 ENCOUNTER — APPOINTMENT (OUTPATIENT)
Dept: CT IMAGING | Facility: CLINIC | Age: 39
End: 2019-06-20
Attending: EMERGENCY MEDICINE
Payer: COMMERCIAL

## 2019-06-20 ENCOUNTER — HOSPITAL ENCOUNTER (EMERGENCY)
Facility: CLINIC | Age: 39
Discharge: HOME | End: 2019-06-20
Attending: EMERGENCY MEDICINE | Admitting: EMERGENCY MEDICINE
Payer: COMMERCIAL

## 2019-06-20 VITALS
DIASTOLIC BLOOD PRESSURE: 92 MMHG | SYSTOLIC BLOOD PRESSURE: 148 MMHG | WEIGHT: 265 LBS | HEART RATE: 97 BPM | BODY MASS INDEX: 35.12 KG/M2 | TEMPERATURE: 99.1 F | HEIGHT: 73 IN | RESPIRATION RATE: 16 BRPM | OXYGEN SATURATION: 99 %

## 2019-06-20 DIAGNOSIS — R10.32 LLQ ABDOMINAL PAIN: ICD-10-CM

## 2019-06-20 DIAGNOSIS — K57.32 DIVERTICULITIS OF COLON: ICD-10-CM

## 2019-06-20 LAB
ANION GAP SERPL CALCULATED.3IONS-SCNC: 5 MMOL/L (ref 3–14)
BASOPHILS # BLD AUTO: 0 10E9/L (ref 0–0.2)
BASOPHILS NFR BLD AUTO: 0.2 %
BUN SERPL-MCNC: 14 MG/DL (ref 7–30)
CALCIUM SERPL-MCNC: 8.8 MG/DL (ref 8.5–10.1)
CHLORIDE SERPL-SCNC: 104 MMOL/L (ref 94–109)
CO2 SERPL-SCNC: 29 MMOL/L (ref 20–32)
CREAT SERPL-MCNC: 0.84 MG/DL (ref 0.66–1.25)
DIFFERENTIAL METHOD BLD: ABNORMAL
EOSINOPHIL # BLD AUTO: 0.1 10E9/L (ref 0–0.7)
EOSINOPHIL NFR BLD AUTO: 0.5 %
ERYTHROCYTE [DISTWIDTH] IN BLOOD BY AUTOMATED COUNT: 12.6 % (ref 10–15)
GFR SERPL CREATININE-BSD FRML MDRD: >90 ML/MIN/{1.73_M2}
GLUCOSE SERPL-MCNC: 98 MG/DL (ref 70–99)
HCT VFR BLD AUTO: 44.4 % (ref 40–53)
HGB BLD-MCNC: 14.4 G/DL (ref 13.3–17.7)
IMM GRANULOCYTES # BLD: 0.1 10E9/L (ref 0–0.4)
IMM GRANULOCYTES NFR BLD: 0.7 %
LYMPHOCYTES # BLD AUTO: 1.6 10E9/L (ref 0.8–5.3)
LYMPHOCYTES NFR BLD AUTO: 11.3 %
MCH RBC QN AUTO: 28.5 PG (ref 26.5–33)
MCHC RBC AUTO-ENTMCNC: 32.4 G/DL (ref 31.5–36.5)
MCV RBC AUTO: 88 FL (ref 78–100)
MONOCYTES # BLD AUTO: 1.6 10E9/L (ref 0–1.3)
MONOCYTES NFR BLD AUTO: 11.4 %
NEUTROPHILS # BLD AUTO: 10.5 10E9/L (ref 1.6–8.3)
NEUTROPHILS NFR BLD AUTO: 75.9 %
NRBC # BLD AUTO: 0 10*3/UL
NRBC BLD AUTO-RTO: 0 /100
PLATELET # BLD AUTO: 225 10E9/L (ref 150–450)
POTASSIUM SERPL-SCNC: 3.5 MMOL/L (ref 3.4–5.3)
RBC # BLD AUTO: 5.05 10E12/L (ref 4.4–5.9)
SODIUM SERPL-SCNC: 138 MMOL/L (ref 133–144)
WBC # BLD AUTO: 13.8 10E9/L (ref 4–11)

## 2019-06-20 PROCEDURE — 96365 THER/PROPH/DIAG IV INF INIT: CPT | Mod: 59

## 2019-06-20 PROCEDURE — 25000128 H RX IP 250 OP 636: Performed by: EMERGENCY MEDICINE

## 2019-06-20 PROCEDURE — 80048 BASIC METABOLIC PNL TOTAL CA: CPT | Performed by: EMERGENCY MEDICINE

## 2019-06-20 PROCEDURE — 96367 TX/PROPH/DG ADDL SEQ IV INF: CPT

## 2019-06-20 PROCEDURE — 74177 CT ABD & PELVIS W/CONTRAST: CPT

## 2019-06-20 PROCEDURE — 96375 TX/PRO/DX INJ NEW DRUG ADDON: CPT

## 2019-06-20 PROCEDURE — 85025 COMPLETE CBC W/AUTO DIFF WBC: CPT | Performed by: EMERGENCY MEDICINE

## 2019-06-20 PROCEDURE — 87040 BLOOD CULTURE FOR BACTERIA: CPT | Mod: XU | Performed by: EMERGENCY MEDICINE

## 2019-06-20 PROCEDURE — 99285 EMERGENCY DEPT VISIT HI MDM: CPT | Mod: 25

## 2019-06-20 PROCEDURE — 96376 TX/PRO/DX INJ SAME DRUG ADON: CPT

## 2019-06-20 RX ORDER — SODIUM CHLORIDE 9 MG/ML
1000 INJECTION, SOLUTION INTRAVENOUS CONTINUOUS
Status: DISCONTINUED | OUTPATIENT
Start: 2019-06-20 | End: 2019-06-20 | Stop reason: HOSPADM

## 2019-06-20 RX ORDER — IOPAMIDOL 755 MG/ML
500 INJECTION, SOLUTION INTRAVASCULAR ONCE
Status: COMPLETED | OUTPATIENT
Start: 2019-06-20 | End: 2019-06-20

## 2019-06-20 RX ORDER — OXYCODONE AND ACETAMINOPHEN 5; 325 MG/1; MG/1
1 TABLET ORAL EVERY 6 HOURS PRN
Qty: 12 TABLET | Refills: 0 | Status: SHIPPED | OUTPATIENT
Start: 2019-06-20 | End: 2020-08-20

## 2019-06-20 RX ORDER — HYDROMORPHONE HYDROCHLORIDE 1 MG/ML
0.5 INJECTION, SOLUTION INTRAMUSCULAR; INTRAVENOUS; SUBCUTANEOUS ONCE
Status: COMPLETED | OUTPATIENT
Start: 2019-06-20 | End: 2019-06-20

## 2019-06-20 RX ORDER — LEVOFLOXACIN 5 MG/ML
500 INJECTION, SOLUTION INTRAVENOUS ONCE
Status: COMPLETED | OUTPATIENT
Start: 2019-06-20 | End: 2019-06-20

## 2019-06-20 RX ORDER — MORPHINE SULFATE 4 MG/ML
4 INJECTION, SOLUTION INTRAMUSCULAR; INTRAVENOUS
Status: DISCONTINUED | OUTPATIENT
Start: 2019-06-20 | End: 2019-06-20 | Stop reason: HOSPADM

## 2019-06-20 RX ADMIN — MORPHINE SULFATE 4 MG: 4 INJECTION INTRAVENOUS at 14:18

## 2019-06-20 RX ADMIN — MORPHINE SULFATE 4 MG: 4 INJECTION INTRAVENOUS at 15:11

## 2019-06-20 RX ADMIN — SODIUM CHLORIDE 65 ML: 9 INJECTION, SOLUTION INTRAVENOUS at 15:17

## 2019-06-20 RX ADMIN — LEVOFLOXACIN 500 MG: 5 INJECTION, SOLUTION INTRAVENOUS at 14:21

## 2019-06-20 RX ADMIN — IOPAMIDOL 100 ML: 755 INJECTION, SOLUTION INTRAVENOUS at 15:17

## 2019-06-20 RX ADMIN — HYDROMORPHONE HYDROCHLORIDE 0.5 MG: 1 INJECTION, SOLUTION INTRAMUSCULAR; INTRAVENOUS; SUBCUTANEOUS at 15:59

## 2019-06-20 RX ADMIN — METRONIDAZOLE 500 MG: 500 INJECTION, SOLUTION INTRAVENOUS at 15:50

## 2019-06-20 ASSESSMENT — MIFFLIN-ST. JEOR: SCORE: 2165.91

## 2019-06-20 ASSESSMENT — ENCOUNTER SYMPTOMS
ABDOMINAL PAIN: 1
CONSTIPATION: 1
NAUSEA: 0
VOMITING: 0
FEVER: 0

## 2019-06-20 NOTE — DISCHARGE INSTRUCTIONS
Discharge Instructions  Abdominal Pain    Abdominal pain can be caused by many things. Your evaluation today does not show the exact cause for your pain. Your doctor today has decided that it is unlikely your pain is due to a life threatening problem, or a problem requiring surgery or hospital admission. Sometimes those problems cannot be found right away, so it is very important that you follow up as directed.  Sometimes only the changes which occur over time allow the cause of your pain to be found.    Return to the Emergency Department for a recheck in 8-12 hours if your pain continues.  If your pain gets worse, changes in location, or feels different, return to the Emergency Department right away.    ADULTS:  Return to the Emergency Department right away if:    You get an oral temperature above 102oF or as directed by your doctor.  You have blood in your stools (bright red or black, tarry stools).  You keep throwing up or can t drink liquids.  You see blood when you throw up.  You can t have a bowel movement or you can t pass gas.  Your stomach gets bloated or bigger.  Your skin or the whites of your eyes look yellow.  You faint.  You have bloody, frequent or painful urination.  You have new symptoms or anything that worries you.    CHILDREN:  Return to the Emergency Department right away if your child has any of the above-listed symptoms or the following:    Pushes your hand away or screams/cries when his/her belly is touched.  You notice your child is very fussy or weak.  Your child is very tired and is too tired to eat or drink.  Your child is dehydrated.  Signs of dehydration can be:  Your infant has had no wet diapers in 4-5 hours.  Your older child has not passed urine in 6-8 hours.  Your infant or child starts to have dry mouth and lips, or no saliva or tears.    PREGNANT WOMEN:  Return to the Emergency Department right away if you have any of the above-listed symptoms or the following:    You have  bleeding, leaking fluid or passing tissue from the vagina.  You have worse pain or cramping, or pain in your shoulder or back.  You have vomiting that will not stop.  You have painful or bloody urination.  You have a temperature of 100oF or more.  Your baby is not moving as much as usual.  You faint.  You get a bad headache with or without eye problems and abdominal pain.  You have a convulsion or seizure.  You have unusual discharge from your vagina and abdominal pain.    Abdominal pain is pretty common during pregnancy.  Your pain may or may not be related to your pregnancy. You should follow-up closely with your OB doctor so they can evaluate you and your baby.  Until you follow-up with your regular doctor, do the following:     Avoid sex and do not put anything in your vagina.  Drink clear fluids.  Only take medications approved by your doctor.    MORE INFORMATION:    Appendicitis:  A possible cause of abdominal pain in any person who still has their appendix is acute appendicitis. Appendicitis is often hard to diagnose.  Testing does not always rule out early appendicitis or other causes of abdominal pain. Close follow-up with your doctor and re-evaluations may be needed to figure out the reason for your abdominal pain.    Follow-up:  It is very important that you make an appointment with your clinic and go to the appointment.  If you do not follow-up with your primary doctor, it may result in missing an important development which could result in permanent injury or disability and/or lasting pain.  If there is any problem keeping your appointment, call your doctor or return to the Emergency Department.    Medications:  Take your medications as directed by your doctor today.  Before using over-the-counter medications, ask your doctor and make sure to take the medications as directed.  If you have any questions about medications, ask your doctor.    Diet:  Resume your normal diet as much as possible, but do not  "eat fried, fatty or spicy foods while you have pain.  Do not drink alcohol or have caffeine.  Do not smoke tobacco.    Probiotics: If you have been given an antibiotic, you may want to also take a probiotic pill or eat yogurt with live cultures. Probiotics have \"good bacteria\" to help your intestines stay healthy. Studies have shown that probiotics help prevent diarrhea and other intestine problems (including C. diff infection) when you take antibiotics. You can buy these without a prescription in the pharmacy section of the store.     If you were given a prescription for medicine here today, be sure to read all of the information (including the package insert) that comes with your prescription.  This will include important information about the medicine, its side effects, and any warnings that you need to know about.  The pharmacist who fills the prescription can provide more information and answer questions you may have about the medicine.  If you have questions or concerns that the pharmacist cannot address, please call or return to the Emergency Department.         Opioid Medication Information    Pain medications are among the most commonly prescribed medicines, so we are including this information for all our patients. If you did not receive pain medication or get a prescription for pain medicine, you can ignore it.     You may have been given a prescription for an opioid (narcotic) pain medicine and/or have received a pain medicine while here in the Emergency Department. These medicines can make you drowsy or impaired. You must not drive, operate dangerous equipment, or engage in any other dangerous activities while taking these medications. If you drive while taking these medications, you could be arrested for DUI, or driving under the influence. Do not drink any alcohol while you are taking these medications.     Opioid pain medications can cause addiction. If you have a history of chemical dependency of " any type, you are at a higher risk of becoming addicted to pain medications.  Only take these prescribed medications to treat your pain when all other options have been tried. Take it for as short a time and as few doses as possible. Store your pain pills in a secure place, as they are frequently stolen and provide a dangerous opportunity for children or visitors in your house to start abusing these powerful medications. We will not replace any lost or stolen medicine.  As soon as your pain is better, you should flush all your remaining medication.     Many prescription pain medications contain Tylenol  (acetaminophen), including Vicodin , Tylenol #3 , Norco , Lortab , and Percocet .  You should not take any extra pills of Tylenol  if you are using these prescription medications or you can get very sick.  Do not ever take more than 3000 mg of acetaminophen in any 24 hour period.    All opioids tend to cause constipation. Drink plenty of water and eat foods that have a lot of fiber, such as fruits, vegetables, prune juice, apple juice and high fiber cereal.  Take a laxative if you don t move your bowels at least every other day. Miralax , Milk of Magnesia, Colace , or Senna  can be used to keep you regular.      Remember that you can always come back to the Emergency Department if you are not able to see your regular doctor in the amount of time listed above, if you get any new symptoms, or if there is anything that worries you.

## 2019-06-20 NOTE — ED AVS SNAPSHOT
[facility] Emergency Department  [address]  [facility] [address]  Phone:  [phone]  Fax:  [phone]                                    Pardeep Waite   MRN: [id number]    Department:  [facility] Emergency Department   Date of Visit:  6/20/2019           After Visit Summary Signature Page    I have received my discharge instructions, and my questions have been answered. I have discussed any challenges I see with this plan with the nurse or doctor.    ..........................................................................................................................................  Patient/Patient Representative Signature      ..........................................................................................................................................  Patient Representative Print Name and Relationship to Patient    ..................................................               ................................................  Date                                   Time    ..........................................................................................................................................  Reviewed by Signature/Title    ...................................................              ..............................................  Date                                               Time          22EPIC Rev 08/18

## 2019-06-20 NOTE — ED TRIAGE NOTES
"Abdominal pain started yesterday AM.  Clinic dx with diverticulitis, started on abx yesterday.  Not feeling better.  States the \"oral abx don't work as good as the IV ones.\"  Woke up diaphoretic this AM.  No vomiting.  ABCDs intact.  "

## 2019-06-20 NOTE — ED PROVIDER NOTES
History     Chief Complaint:  Abdominal Pain    HPI   Pardeep Waite is a 38 year old male with a history of diverticulitis who presents for evaluation of lower abdominal pain. Yesterday, he reports waking up to lower abdominal pain in the exact same location as his prior episodes of diverticulitis. Knowing this, he presented to his primary clinic where he was discharged home on Ceftin and Flagyl based on his history and examination, but was told to return if symptoms worsen. He has taken 3 doses total of the antibiotics with some relief of his pain, but still re-presented to his clinic this morning where he was referred to the ED for a CT scan. Here, he reports the left lower abdominal pain and reports it is at a 6-7/10, as well as one day of constipation which is atypical. He denies any fevers, chills, or vomiting. With the antibiotics, he reports he has been also managing the pain with ibuprofen at home, most recently at 1000. Of note, he reports his first episode of diverticulitis was in 2016 and he ended up having an abscess and was hospitalized for a few days. This is his 4th total episode of diverticulitis, and he reports they have recommended follow-up with surgery but he has yet to follow through.     Allergies:  NKDA    Medications:    Enbrel  Ceftin  Flagyl     Past Medical History:    Psoriatic arthritis  Diverticulitis    Past Surgical History:    The patient does not have any pertinent past surgical history.    Family History:    Diverticulitis    Social History:  Marital Status:   [2]  Wife at bedside.   Positive for tobacco use. Comment: Occasional.   Positive for alcohol use.      Review of Systems   Constitutional: Negative for fever.   Gastrointestinal: Positive for abdominal pain and constipation. Negative for nausea and vomiting.   All other systems reviewed and are negative.        Physical Exam   First Vitals:  BP: (!) 143/96  Pulse: 99  Temp: 99.1  F (37.3  C)  Resp: 16  Height: 185.4  "cm (6' 1\")  Weight: 120.2 kg (265 lb)  SpO2: 98 %      Physical Exam  General: The patient is alert, in no respiratory distress.    HENT: Mucous membranes moist.    Cardiovascular: Regular rate and rhythm. Good pulses in all four extremities. Normal capillary refill and skin turgor.     Respiratory: Lungs are clear. No nasal flaring. No retractions. No wheezing, no crackles.    Gastrointestinal: Abdomen soft. No guarding, no rebound. No palpable hernias. Left lower quadrant abdominal tenderness.     Musculoskeletal: No gross deformity.     Skin: No rashes or petechiae.     Neurologic: The patient is alert and oriented x3. GCS 15. No testable cranial nerve deficit. Follows commands with clear and appropriate speech. Gives appropriate answers. Good strength in all extremities. No gross neurologic deficit. Gross sensation intact. Pupils are round and reactive. No meningismus.     Lymphatic: No cervical adenopathy. No lower extremity swelling.    Psychiatric: The patient is non-tearful.    Emergency Department Course   Imaging:  Radiographic findings were communicated with the patient who voiced understanding of the findings.  CT Abdomen/Pelvis with IV contrast:   1. Acute diverticulitis in the distal descending/proximal sigmoid  colon. No abscess.  2. Trace free fluid in the pelvis is nonspecific, but likely related  to the diverticulitis.  3. Diffuse fatty infiltration of the liver, as per radiology.    Laboratory:  CBC: WBC: 13.8 (H), HGB: 14.4, PLT: 225  BMP: All WNL (Creatinine: 0.84)  Blood cultures x2: Pending    Interventions:  1418 Morphine, 4 mg, IV injection   1421 Levaquin, 500 mg, IV infusion  1511 Morphine, 4 mg, IV injection   1550 Flagyl, 500 mg, IV infusion    Emergency Department Course:  Nursing notes and vitals reviewed. (5880) I performed an exam of the patient as documented above.      IV inserted. Medicine administered as documented above. Blood drawn. This was sent to the lab for further testing, " results above.     The patient was sent for a CT abdomen/pelvis while in the emergency department, findings above.      (7485) I rechecked the patient and discussed the results of his workup thus far. He reports increased pain.      Findings and plan explained to the Patient. Patient discharged home with instructions regarding supportive care, medications, and reasons to return. The importance of close follow-up was reviewed. The patient was prescribed Percocet.      I personally reviewed the laboratory and imaging results with the Patient and answered all related questions prior to discharge.     Impression & Plan      Medical Decision Making:  Pardeep Waite is a 38 year old male who had been started on Ceftin and Flagyl for diverticulitis and reports that he presented to the emergency department because he was no better, however on further questioning he states he is about 50% better, but I was concerned because the patient is on immunosuppressant agents and could potentially have an abscess. I did agree that he needed imaging. His labs here are reassuring. The patient had no signs of abscess, but did have signs of diverticulitis. He was treated with IV antibiotics and was discharged home in good condition. I did write him for pain medication.     Diagnosis:    ICD-10-CM    1. LLQ abdominal pain R10.32    2. Diverticulitis of colon K57.32        Disposition:  discharged to home    Discharge Medications:     Medication List      Started    oxyCODONE-acetaminophen 5-325 MG tablet  Commonly known as:  PERCOCET  1 tablet, Oral, EVERY 6 HOURS PRN         Scribe Disclosure:  I, Arabella Saldivar, am serving as a scribe on 6/20/2019 at 1:30 PM to personally document services performed by Tay Grimes MD based on my observations and the provider's statements to me.      Arabella Saldivar  6/20/2019   [facility] EMERGENCY DEPARTMENT       Tay Grimes MD  06/20/19 6842

## 2019-06-26 LAB
BACTERIA SPEC CULT: NO GROWTH
BACTERIA SPEC CULT: NO GROWTH
Lab: NORMAL
Lab: NORMAL
SPECIMEN SOURCE: NORMAL
SPECIMEN SOURCE: NORMAL

## 2019-12-21 ENCOUNTER — APPOINTMENT (OUTPATIENT)
Dept: CT IMAGING | Facility: CLINIC | Age: 39
End: 2019-12-21
Attending: EMERGENCY MEDICINE
Payer: COMMERCIAL

## 2019-12-21 ENCOUNTER — HOSPITAL ENCOUNTER (EMERGENCY)
Facility: CLINIC | Age: 39
Discharge: HOME | End: 2019-12-21
Attending: EMERGENCY MEDICINE | Admitting: EMERGENCY MEDICINE
Payer: COMMERCIAL

## 2019-12-21 VITALS
WEIGHT: 260 LBS | HEIGHT: 73 IN | OXYGEN SATURATION: 95 % | HEART RATE: 82 BPM | RESPIRATION RATE: 16 BRPM | DIASTOLIC BLOOD PRESSURE: 83 MMHG | TEMPERATURE: 99.3 F | BODY MASS INDEX: 34.46 KG/M2 | SYSTOLIC BLOOD PRESSURE: 141 MMHG

## 2019-12-21 DIAGNOSIS — K57.32 DIVERTICULITIS OF COLON: ICD-10-CM

## 2019-12-21 LAB
ANION GAP SERPL CALCULATED.3IONS-SCNC: 8 MMOL/L (ref 3–14)
BASOPHILS # BLD AUTO: 0 10E9/L (ref 0–0.2)
BASOPHILS NFR BLD AUTO: 0.3 %
BUN SERPL-MCNC: 16 MG/DL (ref 7–30)
CALCIUM SERPL-MCNC: 9.1 MG/DL (ref 8.5–10.1)
CHLORIDE SERPL-SCNC: 107 MMOL/L (ref 94–109)
CO2 SERPL-SCNC: 23 MMOL/L (ref 20–32)
CREAT SERPL-MCNC: 0.76 MG/DL (ref 0.66–1.25)
DIFFERENTIAL METHOD BLD: ABNORMAL
EOSINOPHIL # BLD AUTO: 0.1 10E9/L (ref 0–0.7)
EOSINOPHIL NFR BLD AUTO: 0.8 %
ERYTHROCYTE [DISTWIDTH] IN BLOOD BY AUTOMATED COUNT: 12.3 % (ref 10–15)
GFR SERPL CREATININE-BSD FRML MDRD: >90 ML/MIN/{1.73_M2}
GLUCOSE SERPL-MCNC: 101 MG/DL (ref 70–99)
HCT VFR BLD AUTO: 45.7 % (ref 40–53)
HGB BLD-MCNC: 14.9 G/DL (ref 13.3–17.7)
IMM GRANULOCYTES # BLD: 0.1 10E9/L (ref 0–0.4)
IMM GRANULOCYTES NFR BLD: 0.6 %
LYMPHOCYTES # BLD AUTO: 2.2 10E9/L (ref 0.8–5.3)
LYMPHOCYTES NFR BLD AUTO: 16.4 %
MCH RBC QN AUTO: 27.9 PG (ref 26.5–33)
MCHC RBC AUTO-ENTMCNC: 32.6 G/DL (ref 31.5–36.5)
MCV RBC AUTO: 86 FL (ref 78–100)
MONOCYTES # BLD AUTO: 1.6 10E9/L (ref 0–1.3)
MONOCYTES NFR BLD AUTO: 11.6 %
NEUTROPHILS # BLD AUTO: 9.6 10E9/L (ref 1.6–8.3)
NEUTROPHILS NFR BLD AUTO: 70.3 %
NRBC # BLD AUTO: 0 10*3/UL
NRBC BLD AUTO-RTO: 0 /100
PLATELET # BLD AUTO: 272 10E9/L (ref 150–450)
POTASSIUM SERPL-SCNC: 4 MMOL/L (ref 3.4–5.3)
RBC # BLD AUTO: 5.34 10E12/L (ref 4.4–5.9)
SODIUM SERPL-SCNC: 138 MMOL/L (ref 133–144)
WBC # BLD AUTO: 13.7 10E9/L (ref 4–11)

## 2019-12-21 PROCEDURE — 96365 THER/PROPH/DIAG IV INF INIT: CPT | Mod: 59

## 2019-12-21 PROCEDURE — 99285 EMERGENCY DEPT VISIT HI MDM: CPT | Mod: 25

## 2019-12-21 PROCEDURE — 96375 TX/PRO/DX INJ NEW DRUG ADDON: CPT

## 2019-12-21 PROCEDURE — 25000128 H RX IP 250 OP 636: Performed by: EMERGENCY MEDICINE

## 2019-12-21 PROCEDURE — 96376 TX/PRO/DX INJ SAME DRUG ADON: CPT

## 2019-12-21 PROCEDURE — 85025 COMPLETE CBC W/AUTO DIFF WBC: CPT | Performed by: EMERGENCY MEDICINE

## 2019-12-21 PROCEDURE — 80048 BASIC METABOLIC PNL TOTAL CA: CPT | Performed by: EMERGENCY MEDICINE

## 2019-12-21 PROCEDURE — 25000125 ZZHC RX 250: Performed by: EMERGENCY MEDICINE

## 2019-12-21 PROCEDURE — 74177 CT ABD & PELVIS W/CONTRAST: CPT

## 2019-12-21 RX ORDER — ONDANSETRON 4 MG/1
4 TABLET, FILM COATED ORAL EVERY 8 HOURS PRN
Qty: 6 TABLET | Refills: 0 | Status: SHIPPED | OUTPATIENT
Start: 2019-12-21 | End: 2020-08-20

## 2019-12-21 RX ORDER — MORPHINE SULFATE 4 MG/ML
4 INJECTION, SOLUTION INTRAMUSCULAR; INTRAVENOUS
Status: DISCONTINUED | OUTPATIENT
Start: 2019-12-21 | End: 2019-12-21 | Stop reason: HOSPADM

## 2019-12-21 RX ORDER — IOPAMIDOL 755 MG/ML
500 INJECTION, SOLUTION INTRAVASCULAR ONCE
Status: COMPLETED | OUTPATIENT
Start: 2019-12-21 | End: 2019-12-21

## 2019-12-21 RX ORDER — ONDANSETRON 2 MG/ML
4 INJECTION INTRAMUSCULAR; INTRAVENOUS EVERY 30 MIN PRN
Status: DISCONTINUED | OUTPATIENT
Start: 2019-12-21 | End: 2019-12-21 | Stop reason: HOSPADM

## 2019-12-21 RX ORDER — OXYCODONE HYDROCHLORIDE 5 MG/1
5 TABLET ORAL EVERY 6 HOURS PRN
Qty: 12 TABLET | Refills: 0 | Status: SHIPPED | OUTPATIENT
Start: 2019-12-21 | End: 2020-08-20

## 2019-12-21 RX ORDER — HYDROMORPHONE HYDROCHLORIDE 1 MG/ML
0.5 INJECTION, SOLUTION INTRAMUSCULAR; INTRAVENOUS; SUBCUTANEOUS
Status: COMPLETED | OUTPATIENT
Start: 2019-12-21 | End: 2019-12-21

## 2019-12-21 RX ORDER — CEFTRIAXONE 1 G/1
1 INJECTION, POWDER, FOR SOLUTION INTRAMUSCULAR; INTRAVENOUS ONCE
Status: COMPLETED | OUTPATIENT
Start: 2019-12-21 | End: 2019-12-21

## 2019-12-21 RX ADMIN — CEFTRIAXONE SODIUM 1 G: 1 INJECTION, POWDER, FOR SOLUTION INTRAMUSCULAR; INTRAVENOUS at 11:06

## 2019-12-21 RX ADMIN — ONDANSETRON HYDROCHLORIDE 4 MG: 2 INJECTION, SOLUTION INTRAMUSCULAR; INTRAVENOUS at 10:16

## 2019-12-21 RX ADMIN — HYDROMORPHONE HYDROCHLORIDE 0.5 MG: 1 INJECTION, SOLUTION INTRAMUSCULAR; INTRAVENOUS; SUBCUTANEOUS at 10:16

## 2019-12-21 RX ADMIN — METRONIDAZOLE 500 MG: 500 INJECTION, SOLUTION INTRAVENOUS at 11:42

## 2019-12-21 RX ADMIN — MORPHINE SULFATE 4 MG: 4 INJECTION INTRAVENOUS at 11:37

## 2019-12-21 RX ADMIN — IOPAMIDOL 100 ML: 755 INJECTION, SOLUTION INTRAVENOUS at 10:46

## 2019-12-21 RX ADMIN — SODIUM CHLORIDE 65 ML: 9 INJECTION, SOLUTION INTRAVENOUS at 10:46

## 2019-12-21 RX ADMIN — MORPHINE SULFATE 4 MG: 4 INJECTION INTRAVENOUS at 12:47

## 2019-12-21 ASSESSMENT — ENCOUNTER SYMPTOMS
CONSTIPATION: 1
ABDOMINAL PAIN: 1
DYSURIA: 0
FEVER: 0
VOMITING: 0
DIARRHEA: 0
NAUSEA: 0
CHILLS: 0

## 2019-12-21 ASSESSMENT — MIFFLIN-ST. JEOR: SCORE: 2138.23

## 2019-12-21 NOTE — ED TRIAGE NOTES
Left lower abdominal pain that began yesterday.  History of diverticulitis and this pain is the same.  Patient alert and oriented x3.  Airway, breathing and circulation intact.

## 2019-12-21 NOTE — ED PROVIDER NOTES
"  History     Chief Complaint:  Abdominal Pain    HPI   Pardeep Waite is a 39 year old male with a history of diverticulitis who presents with abdominal pain. The patient notes that he has an extensive history of diverticulitis with perforation and that he felt a pop last night around 1900 and that he could not fall asleep. He reports that he has passed gas but that he has not had a BM yet. He denies fever, chills, nausea, emesis, testicular pain, diarrhea, dysuria, or any recent travel outside the country. He notes that he has not used anything for pain. He reports that the last time he ate was last night.       Allergies:  No Known Drug Allergies     Medications:    Enbrel  Ativan  Prednisone    Past Medical History:    Psoriatic arthritis  Diverticulitis   Gout  Anxiety    Past Surgical History:    The patient does not have any pertinent past surgical history.    Family History:    No past pertinent family history.    Social History:  Smoking status: Never smoker  Alcohol use: Yes, but states that he has cut back significantly on his drinking  Drug use: No  PCP: Physician No Ref-Primary  Marital Status:        Review of Systems   Constitutional: Negative for chills and fever.   Gastrointestinal: Positive for abdominal pain and constipation. Negative for diarrhea, nausea and vomiting.   Genitourinary: Negative for dysuria and testicular pain.   All other systems reviewed and are negative.      Physical Exam     Patient Vitals for the past 24 hrs:   BP Temp Temp src Pulse Heart Rate Resp SpO2 Height Weight   12/21/19 1200 134/81 -- -- 76 -- -- 96 % -- --   12/21/19 1143 (!) 142/91 -- -- -- 83 16 97 % -- --   12/21/19 0945 (!) 133/98 99.3  F (37.4  C) Oral -- 112 20 98 % 1.854 m (6' 1\") 117.9 kg (260 lb)     Physical Exam  General: Patient is awake, alert and interactive when I enter the room  Head: The scalp, face, and head appear normal  Eyes: The pupils are equal, round, and reactive to light. Conjunctivae " and sclerae are normal  ENT: External acoustic canals are normal. The oropharynx is normal without erythema. Uvula is in the midline  Neck: Normal range of motion. No anterior cervical lymphadenopathy noted  CV: Regular rate. S1/S2. No murmurs.   Resp: Lungs are clear without wheezes or rales. No respiratory distress.   GI: Abdomen is soft, no rigidity, guarding, or rebound. No distension.LLQ tenderness present.     MS: Normal tone. Joints grossly normal without effusions. No asymmetric leg swelling, calf or thigh tenderness.    Skin: No rash or lesions noted. Normal capillary refill noted  Neuro: Speech is normal and fluent. Face is symmetric. Moving all extremities.   Psych:  Normal affect.  Appropriate interactions.    Emergency Department Course     Imaging:  Radiology findings were communicated with the patient who voiced understanding of the findings.    CT abdomen Pelvis w Contrast:   Acute diverticulitis proximal sigmoid colon. No associated  abscess or free intraperitoneal air.    Reading per radiology     Laboratory:  Laboratory findings were communicated with the patient who voiced understanding of the findings.    CBC: WBC 13.7(H), HGB 14.9,   BMP: Glucose 101(H) o/w WNL (Creatinine 0.76)      Procedures    Interventions:  1016 Zofran 4mg, IV   1016 Dilaudid 0.5mg IV   1106 Rocephin, 1 g, IV injection  1137 Morphine, 4 mg, IV  1142 Flagyl, 500 mg, infusion, IV  1247 Morphine, 4 mg, IV    Emergency Department Course:   Nursing notes and vitals reviewed.    1004 I performed an exam of the patient as documented above.     1013 IV was inserted and blood was drawn for laboratory testing, results above.     1044 The patient was sent for a Abdominal CT while in the emergency department, results above.      1100 I checked on and updated the patient.     1240 I personally reviewed the results with the patient and answered all related questions prior to discharge.    Impression & Plan      Medical Decision  Making:  Pardeep Waite is a 39 year old male who presents with abdominal pain and nausea. Very similar to past presentations of diverticulitis. A broad differential diagnosis was considered including urinary obstruction, colitis, appendicitis, intestinal cramping, aortic pathologies, pyelonephritis, ureterolithiasis, UTI, constipation, diverticulitis, obstruction, ileus, volvulus, etc as possibilities. CT confirms diverticulitis without abscess or perforation. His pain has been controlled by the morphine in the emergency department. On recheck, he has a non-surgical exam, pain is controlled, and he is tolerating POs.  I discussed indications for admission versus discharge and together we opted for outpatient management.  I will prescribe augmentin and supportive medications as per below.  We discussed the natural history of this problem, and I discussed dietary precautions. I recommended he return to the ED for worsening pain, fever, vomiting, bloody or black stools, or for any other concerning symptoms. I recommended he follow up with his primary care physician in 2-3 days. All question were answered and the patient is amenable for discharge at this time.      Diagnosis:    ICD-10-CM    1. Diverticulitis of colon K57.32        Disposition:   The patient is discharged to home.     Discharge Medications:  New Prescriptions    AMOXICILLIN-CLAVULANATE (AUGMENTIN) 875-125 MG TABLET    Take 1 tablet by mouth 2 times daily for 7 days    ONDANSETRON (ZOFRAN) 4 MG TABLET    Take 1 tablet (4 mg) by mouth every 8 hours as needed for nausea    OXYCODONE (ROXICODONE) 5 MG TABLET    Take 1 tablet (5 mg) by mouth every 6 hours as needed for pain       Scribe Disclosure:  I, Lilia Kent, am serving as a scribe at 1004 on 12/21/2019 to document services personally performed by Tay Smith MD based on my observations and the provider's statements to me.   [facility] EMERGENCY DEPARTMENT       Sarah  Tay Eckert MD  12/21/19 5426

## 2019-12-21 NOTE — ED AVS SNAPSHOT
[facility] Emergency Department  [address]  [facility] [address]  Phone:  [phone]  Fax:  [phone]                                    Pardeep Waite   MRN: [id number]    Department:  [facility] Emergency Department   Date of Visit:  12/21/2019           After Visit Summary Signature Page    I have received my discharge instructions, and my questions have been answered. I have discussed any challenges I see with this plan with the nurse or doctor.    ..........................................................................................................................................  Patient/Patient Representative Signature      ..........................................................................................................................................  Patient Representative Print Name and Relationship to Patient    ..................................................               ................................................  Date                                   Time    ..........................................................................................................................................  Reviewed by Signature/Title    ...................................................              ..............................................  Date                                               Time          22EPIC Rev 08/18

## 2020-07-06 ENCOUNTER — HOSPITAL ENCOUNTER (EMERGENCY)
Facility: CLINIC | Age: 40
Discharge: HOME | End: 2020-07-07
Attending: EMERGENCY MEDICINE | Admitting: EMERGENCY MEDICINE
Payer: COMMERCIAL

## 2020-07-06 DIAGNOSIS — K57.32 DIVERTICULITIS OF COLON: ICD-10-CM

## 2020-07-06 LAB
ALBUMIN SERPL-MCNC: 4 G/DL (ref 3.4–5)
ALP SERPL-CCNC: 104 U/L (ref 40–150)
ALT SERPL W P-5'-P-CCNC: 55 U/L (ref 0–70)
ANION GAP SERPL CALCULATED.3IONS-SCNC: 6 MMOL/L (ref 3–14)
AST SERPL W P-5'-P-CCNC: 28 U/L (ref 0–45)
BASOPHILS # BLD AUTO: 0 10E9/L (ref 0–0.2)
BASOPHILS NFR BLD AUTO: 0.2 %
BILIRUB SERPL-MCNC: 1 MG/DL (ref 0.2–1.3)
BUN SERPL-MCNC: 11 MG/DL (ref 7–30)
CALCIUM SERPL-MCNC: 8.9 MG/DL (ref 8.5–10.1)
CHLORIDE SERPL-SCNC: 102 MMOL/L (ref 94–109)
CO2 SERPL-SCNC: 27 MMOL/L (ref 20–32)
CREAT SERPL-MCNC: 0.83 MG/DL (ref 0.66–1.25)
DIFFERENTIAL METHOD BLD: ABNORMAL
EOSINOPHIL # BLD AUTO: 0.1 10E9/L (ref 0–0.7)
EOSINOPHIL NFR BLD AUTO: 0.4 %
ERYTHROCYTE [DISTWIDTH] IN BLOOD BY AUTOMATED COUNT: 12.7 % (ref 10–15)
GFR SERPL CREATININE-BSD FRML MDRD: >90 ML/MIN/{1.73_M2}
GLUCOSE SERPL-MCNC: 104 MG/DL (ref 70–99)
HCT VFR BLD AUTO: 49.1 % (ref 40–53)
HGB BLD-MCNC: 16.2 G/DL (ref 13.3–17.7)
IMM GRANULOCYTES # BLD: 0.1 10E9/L (ref 0–0.4)
IMM GRANULOCYTES NFR BLD: 0.8 %
LACTATE BLD-SCNC: 1 MMOL/L (ref 0.7–2)
LYMPHOCYTES # BLD AUTO: 1.8 10E9/L (ref 0.8–5.3)
LYMPHOCYTES NFR BLD AUTO: 12.6 %
MCH RBC QN AUTO: 28.7 PG (ref 26.5–33)
MCHC RBC AUTO-ENTMCNC: 33 G/DL (ref 31.5–36.5)
MCV RBC AUTO: 87 FL (ref 78–100)
MONOCYTES # BLD AUTO: 1.5 10E9/L (ref 0–1.3)
MONOCYTES NFR BLD AUTO: 10.3 %
NEUTROPHILS # BLD AUTO: 11 10E9/L (ref 1.6–8.3)
NEUTROPHILS NFR BLD AUTO: 75.7 %
NRBC # BLD AUTO: 0 10*3/UL
NRBC BLD AUTO-RTO: 0 /100
PLATELET # BLD AUTO: 265 10E9/L (ref 150–450)
POTASSIUM SERPL-SCNC: 4 MMOL/L (ref 3.4–5.3)
PROT SERPL-MCNC: 8.2 G/DL (ref 6.8–8.8)
RBC # BLD AUTO: 5.64 10E12/L (ref 4.4–5.9)
SODIUM SERPL-SCNC: 135 MMOL/L (ref 133–144)
WBC # BLD AUTO: 14.5 10E9/L (ref 4–11)

## 2020-07-06 PROCEDURE — 96375 TX/PRO/DX INJ NEW DRUG ADDON: CPT

## 2020-07-06 PROCEDURE — 96376 TX/PRO/DX INJ SAME DRUG ADON: CPT

## 2020-07-06 PROCEDURE — 96367 TX/PROPH/DG ADDL SEQ IV INF: CPT

## 2020-07-06 PROCEDURE — 25000128 H RX IP 250 OP 636: Performed by: EMERGENCY MEDICINE

## 2020-07-06 PROCEDURE — 96365 THER/PROPH/DIAG IV INF INIT: CPT

## 2020-07-06 PROCEDURE — 87040 BLOOD CULTURE FOR BACTERIA: CPT | Mod: XS | Performed by: EMERGENCY MEDICINE

## 2020-07-06 PROCEDURE — 99285 EMERGENCY DEPT VISIT HI MDM: CPT | Mod: 25

## 2020-07-06 PROCEDURE — 85025 COMPLETE CBC W/AUTO DIFF WBC: CPT | Performed by: EMERGENCY MEDICINE

## 2020-07-06 PROCEDURE — 83605 ASSAY OF LACTIC ACID: CPT | Performed by: EMERGENCY MEDICINE

## 2020-07-06 PROCEDURE — 80053 COMPREHEN METABOLIC PANEL: CPT | Performed by: EMERGENCY MEDICINE

## 2020-07-06 PROCEDURE — 25800030 ZZH RX IP 258 OP 636: Performed by: EMERGENCY MEDICINE

## 2020-07-06 RX ORDER — METRONIDAZOLE 500 MG/1
500 TABLET ORAL 3 TIMES DAILY
Qty: 30 TABLET | Refills: 0 | Status: SHIPPED | OUTPATIENT
Start: 2020-07-06 | End: 2020-07-16

## 2020-07-06 RX ORDER — SODIUM CHLORIDE 9 MG/ML
1000 INJECTION, SOLUTION INTRAVENOUS CONTINUOUS
Status: DISCONTINUED | OUTPATIENT
Start: 2020-07-06 | End: 2020-07-07 | Stop reason: HOSPADM

## 2020-07-06 RX ORDER — CIPROFLOXACIN 500 MG/1
500 TABLET, FILM COATED ORAL 2 TIMES DAILY
Qty: 20 TABLET | Refills: 0 | Status: SHIPPED | OUTPATIENT
Start: 2020-07-06 | End: 2020-07-16

## 2020-07-06 RX ORDER — LEVOFLOXACIN 5 MG/ML
500 INJECTION, SOLUTION INTRAVENOUS ONCE
Status: COMPLETED | OUTPATIENT
Start: 2020-07-06 | End: 2020-07-06

## 2020-07-06 RX ORDER — HYDROCODONE BITARTRATE AND ACETAMINOPHEN 5; 325 MG/1; MG/1
1-2 TABLET ORAL EVERY 4 HOURS PRN
Qty: 8 TABLET | Refills: 0 | Status: SHIPPED | OUTPATIENT
Start: 2020-07-06 | End: 2020-08-20

## 2020-07-06 RX ORDER — ONDANSETRON 4 MG/1
4 TABLET, ORALLY DISINTEGRATING ORAL EVERY 8 HOURS PRN
Qty: 10 TABLET | Refills: 0 | Status: SHIPPED | OUTPATIENT
Start: 2020-07-06 | End: 2020-07-09

## 2020-07-06 RX ORDER — MORPHINE SULFATE 4 MG/ML
4 INJECTION, SOLUTION INTRAMUSCULAR; INTRAVENOUS
Status: COMPLETED | OUTPATIENT
Start: 2020-07-06 | End: 2020-07-06

## 2020-07-06 RX ORDER — POLYETHYLENE GLYCOL 3350 17 G/17G
1 POWDER, FOR SOLUTION ORAL DAILY
Qty: 527 G | Refills: 0 | Status: SHIPPED | OUTPATIENT
Start: 2020-07-06 | End: 2020-08-05

## 2020-07-06 RX ADMIN — MORPHINE SULFATE 4 MG: 4 INJECTION INTRAVENOUS at 22:25

## 2020-07-06 RX ADMIN — MORPHINE SULFATE 4 MG: 4 INJECTION INTRAVENOUS at 23:04

## 2020-07-06 RX ADMIN — LEVOFLOXACIN 500 MG: 5 INJECTION, SOLUTION INTRAVENOUS at 23:01

## 2020-07-06 RX ADMIN — SODIUM CHLORIDE 1000 ML: 9 INJECTION, SOLUTION INTRAVENOUS at 21:37

## 2020-07-06 RX ADMIN — METRONIDAZOLE 500 MG: 500 INJECTION, SOLUTION INTRAVENOUS at 21:50

## 2020-07-06 RX ADMIN — MORPHINE SULFATE 4 MG: 4 INJECTION INTRAVENOUS at 21:37

## 2020-07-06 ASSESSMENT — ENCOUNTER SYMPTOMS
FEVER: 1
ABDOMINAL PAIN: 1
DIARRHEA: 0
CONSTIPATION: 0

## 2020-07-06 NOTE — ED AVS SNAPSHOT
[facility] Emergency Department  [address]  [facility] [address]  Phone:  [phone]  Fax:  [phone]                                    Pardeep Waite   MRN: [id number]    Department:  [facility] Emergency Department   Date of Visit:  7/6/2020           After Visit Summary Signature Page    I have received my discharge instructions, and my questions have been answered. I have discussed any challenges I see with this plan with the nurse or doctor.    ..........................................................................................................................................  Patient/Patient Representative Signature      ..........................................................................................................................................  Patient Representative Print Name and Relationship to Patient    ..................................................               ................................................  Date                                   Time    ..........................................................................................................................................  Reviewed by Signature/Title    ...................................................              ..............................................  Date                                               Time          22EPIC Rev 08/18

## 2020-07-07 VITALS
HEART RATE: 91 BPM | RESPIRATION RATE: 18 BRPM | SYSTOLIC BLOOD PRESSURE: 143 MMHG | TEMPERATURE: 99.9 F | DIASTOLIC BLOOD PRESSURE: 92 MMHG | OXYGEN SATURATION: 96 %

## 2020-07-07 NOTE — ED TRIAGE NOTES
Arrives with left side abdomen pain and fever since last night, states history of diverticulitis. Alert and oriented, ABCs intact.

## 2020-07-07 NOTE — ED PROVIDER NOTES
History     Chief Complaint:  Abdominal Pain      HPI   Pardeep Waite is a 39 year old male with a history of diverticulitis who presents with left sided abdominal pain and fever which began last night. He describes his pain as a 9/10 if he tries to move but tolerable while resting. The patient reports this feels very similar to his previous episodes of diverticulitis. He denies constipation or diarrhea. Did have a fever today.  No blood in stool. No testicular pain or UTI sx.       Allergies:  No known drug allergies    Medications:    Enbrel   Ativan    Past Medical History:    Psoriatic arthritis   Diverticulitis   Gout  Anxiety  Obesity     Past Surgical History:    History reviewed. No pertinent surgical history.    Family History:    History reviewed. No pertinent family history.     Social History:  Smoking status: yes  Alcohol use: yes, 1 glass of wine per day  Marital Status:   [2]    Review of Systems   Constitutional: Positive for fever.   Gastrointestinal: Positive for abdominal pain. Negative for constipation and diarrhea.   All other systems reviewed and are negative.      Physical Exam     Patient Vitals for the past 24 hrs:   BP Temp Pulse Resp SpO2   07/06/20 2145 133/84 -- 102 -- --   07/06/20 2130 -- -- -- -- 98 %   07/06/20 1933 (!) 158/110 99.9  F (37.7  C) 122 16 96 %       Physical Exam  Constitutional: Alert, attentive, GCS 15  HENT:    Nose: Nose normal.    Mouth/Throat: Oropharynx is clear, mucous membranes are moist  Eyes: EOM are normal, anicteric, conjugate gaze  CV: regular rate and rhythm; no murmurs  Chest: Effort normal and breath sounds clear without wheezing or rales, symmetric bilaterally   GI:  Left lower quadrant tenderness. No distension. No guarding or rebound.    MSK: No LE edema, no tenderness to palpation of BLE.  Neurological: Alert, attentive, moving all extremities equally.   Skin: Skin is warm and dry.    Emergency Department Course       Laboratory:  Labs  Ordered and Resulted from Time of ED Arrival Up to the Time of Departure from the ED   CBC WITH PLATELETS DIFFERENTIAL - Abnormal; Notable for the following components:       Result Value    WBC 14.5 (*)     Absolute Neutrophil 11.0 (*)     Absolute Monocytes 1.5 (*)     All other components within normal limits   COMPREHENSIVE METABOLIC PANEL - Abnormal; Notable for the following components:    Glucose 104 (*)     All other components within normal limits   LACTIC ACID WHOLE BLOOD   PERIPHERAL IV CATHETER   BLOOD CULTURE   BLOOD CULTURE             Interventions:  Medications   0.9% sodium chloride BOLUS (1,000 mLs Intravenous New Bag 20)     Followed by   sodium chloride 0.9% infusion (has no administration in time range)   levofloxacin (LEVAQUIN) infusion 500 mg (500 mg Intravenous New Bag 20)   morphine (PF) injection 4 mg (4 mg Intravenous Given 20)   metroNIDAZOLE (FLAGYL) infusion 500 mg (0 mg Intravenous Stopped 20)           Impression & Plan    Medical Decision Makin-year-old male with past medical history significant for gout, recurrent episodes of diverticulitis presenting for evaluation of 1 day left lower quadrant pain he reports feels identical to his previous episodes of diverticulitis.  On exam, he is isolated left lower no rebound or guarding.  He has no referred testicular pain.  He does have a mild leukocytosis here antibiotics.  In discussion with him, I do feel it is safe not to CT him at this time given he reports this feels identical to prior episodes.  Low suspicion for hollow viscus perforation or obstruction at this time.  He is hopeful to have colonoscopy and flareup of this appropriate meals, fiber was reviewed.  He was able to tolerate p.o., return precautions were reviewed.  He was discharged home on Cipro Flagyl.    Diagnosis:    ICD-10-CM    1. Diverticulitis of colon  K57.32        Disposition:  discharged to home    Discharge  Medications:  New Prescriptions    CIPROFLOXACIN (CIPRO) 500 MG TABLET    Take 1 tablet (500 mg) by mouth 2 times daily for 10 days    HYDROCODONE-ACETAMINOPHEN (NORCO) 5-325 MG TABLET    Take 1-2 tablets by mouth every 4 hours as needed for pain    METRONIDAZOLE (FLAGYL) 500 MG TABLET    Take 1 tablet (500 mg) by mouth 3 times daily for 10 days    ONDANSETRON (ZOFRAN ODT) 4 MG ODT TAB    Take 1 tablet (4 mg) by mouth every 8 hours as needed for nausea    POLYETHYLENE GLYCOL (MIRALAX) 17 GM/SCOOP POWDER    Take 17 g (1 capful) by mouth daily     Clyde Kelly MD  [employer]  11:46 PM 07/06/20     Scribe Disclosure:  I, Libra Chris, am serving as a scribe at 9:41 PM on 7/6/2020 to document services personally performed by Tay Grimes MD based on my observations and the provider's statements to me.          Clyde Kelly MD  07/06/20 [id number]

## 2020-07-08 DIAGNOSIS — Z11.59 ENCOUNTER FOR SCREENING FOR OTHER VIRAL DISEASES: Primary | ICD-10-CM

## 2020-07-13 LAB
BACTERIA SPEC CULT: NO GROWTH
BACTERIA SPEC CULT: NO GROWTH
SPECIMEN SOURCE: NORMAL
SPECIMEN SOURCE: NORMAL

## 2020-08-26 DIAGNOSIS — Z11.59 ENCOUNTER FOR SCREENING FOR OTHER VIRAL DISEASES: ICD-10-CM

## 2020-08-26 LAB
LABORATORY COMMENT REPORT: NORMAL
SARS-COV-2 RNA SPEC QL NAA+PROBE: NEGATIVE
SARS-COV-2 RNA SPEC QL NAA+PROBE: NORMAL
SPECIMEN SOURCE: NORMAL
SPECIMEN SOURCE: NORMAL

## 2020-08-26 PROCEDURE — U0003 INFECTIOUS AGENT DETECTION BY NUCLEIC ACID (DNA OR RNA); SEVERE ACUTE RESPIRATORY SYNDROME CORONAVIRUS 2 (SARS-COV-2) (CORONAVIRUS DISEASE [COVID-19]), AMPLIFIED PROBE TECHNIQUE, MAKING USE OF HIGH THROUGHPUT TECHNOLOGIES AS DESCRIBED BY CMS-2020-01-R: HCPCS | Performed by: SURGERY

## 2020-08-27 ENCOUNTER — HOSPITAL ENCOUNTER (OUTPATIENT)
Facility: CLINIC | Age: 40
Discharge: HOME | End: 2020-08-27
Attending: SURGERY | Admitting: SURGERY
Payer: COMMERCIAL

## 2020-08-27 ENCOUNTER — APPOINTMENT (OUTPATIENT)
Dept: SURGERY | Facility: PHYSICIAN GROUP | Age: 40
End: 2020-08-27
Payer: COMMERCIAL

## 2020-08-27 VITALS
BODY MASS INDEX: 34.46 KG/M2 | DIASTOLIC BLOOD PRESSURE: 73 MMHG | HEIGHT: 73 IN | OXYGEN SATURATION: 97 % | HEART RATE: 73 BPM | SYSTOLIC BLOOD PRESSURE: 99 MMHG | RESPIRATION RATE: 16 BRPM | WEIGHT: 260 LBS

## 2020-08-27 LAB — COLONOSCOPY: NORMAL

## 2020-08-27 PROCEDURE — G0500 MOD SEDAT ENDO SERVICE >5YRS: HCPCS | Performed by: SURGERY

## 2020-08-27 PROCEDURE — 25000128 H RX IP 250 OP 636: Performed by: SURGERY

## 2020-08-27 PROCEDURE — G0121 COLON CA SCRN NOT HI RSK IND: HCPCS | Performed by: SURGERY

## 2020-08-27 PROCEDURE — 45378 DIAGNOSTIC COLONOSCOPY: CPT | Performed by: SURGERY

## 2020-08-27 RX ORDER — LIDOCAINE 40 MG/G
CREAM TOPICAL
Status: DISCONTINUED | OUTPATIENT
Start: 2020-08-27 | End: 2020-08-31 | Stop reason: HOSPADM

## 2020-08-27 RX ORDER — ONDANSETRON 2 MG/ML
4 INJECTION INTRAMUSCULAR; INTRAVENOUS EVERY 6 HOURS PRN
Status: DISCONTINUED | OUTPATIENT
Start: 2020-08-27 | End: 2020-08-31 | Stop reason: HOSPADM

## 2020-08-27 RX ORDER — NALOXONE HYDROCHLORIDE 0.4 MG/ML
.1-.4 INJECTION, SOLUTION INTRAMUSCULAR; INTRAVENOUS; SUBCUTANEOUS
Status: DISCONTINUED | OUTPATIENT
Start: 2020-08-27 | End: 2020-08-28 | Stop reason: HOSPADM

## 2020-08-27 RX ORDER — ONDANSETRON 2 MG/ML
4 INJECTION INTRAMUSCULAR; INTRAVENOUS
Status: DISCONTINUED | OUTPATIENT
Start: 2020-08-27 | End: 2020-08-31 | Stop reason: HOSPADM

## 2020-08-27 RX ORDER — FLUMAZENIL 0.1 MG/ML
0.2 INJECTION, SOLUTION INTRAVENOUS
Status: DISCONTINUED | OUTPATIENT
Start: 2020-08-27 | End: 2020-08-27 | Stop reason: HOSPADM

## 2020-08-27 RX ORDER — FENTANYL CITRATE 50 UG/ML
INJECTION, SOLUTION INTRAMUSCULAR; INTRAVENOUS PRN
Status: DISCONTINUED | OUTPATIENT
Start: 2020-08-27 | End: 2020-08-31 | Stop reason: HOSPADM

## 2020-08-27 RX ORDER — ONDANSETRON 4 MG/1
4 TABLET, ORALLY DISINTEGRATING ORAL EVERY 6 HOURS PRN
Status: DISCONTINUED | OUTPATIENT
Start: 2020-08-27 | End: 2020-08-31 | Stop reason: HOSPADM

## 2020-08-27 ASSESSMENT — MIFFLIN-ST. JEOR: SCORE: 2138.23

## 2020-08-27 NOTE — DISCHARGE INSTRUCTIONS

## 2020-08-27 NOTE — PRE-PROCEDURE
GENERAL PRE-PROCEDURE:   Procedure:  Colonoscopy  Date/Time:  8/27/2020 8:42 AM    Written consent obtained?: Yes    Risks and benefits: Risks, benefits and alternatives were discussed    Consent given by:  Patient  Patient states understanding of procedure being performed: Yes    Patient's understanding of procedure matches consent: Yes    Procedure consent matches procedure scheduled: Yes    Expected level of sedation:  Moderate  Appropriately NPO:  Yes  ASA Class:  Class 1- healthy patient  Mallampati  :  Grade 2- soft palate, base of uvula, tonsillar pillars, and portion of posterior pharyngeal wall visible  Lungs:  Lungs clear with good breath sounds bilaterally  Heart:  Normal heart sounds and rate  History & Physical reviewed:  History and physical reviewed and no updates needed  Statement of review:  I have reviewed the lab findings, diagnostic data, medications, and the plan for sedation

## 2020-08-27 NOTE — LETTER
August 3, 2020      Pardeep Waite  [address] [facility] [address]         Please be aware that coverage of these services is subject to the terms and limitations of your health insurance plan.  Call member services at your health plan with any benefit or coverage questions.    Thank you for choosing [facility]. You are scheduled for the following service(s):    Date:  Thursday August 27, 2020             Procedure:  COLONOSCOPY  Doctor:        Dr Zaragoza   Arrival Time:  0800  *Enter and check in at the Main Hospital Entrance*  Procedure Time:  0830      Location:   [facility]        Endoscopy Department, First Floor         [address] [place], [place] [address]      [phone] or [phone] () to reschedule      MIRALAX -GATORADE  PREP  Colonoscopy is the most accurate test to detect colon polyps and colon cancer; and the only test where polyps can be removed. During this procedure, a doctor examines the lining of your large intestine and rectum through a flexible tube.   Transportation  You must arrange for a ride for the day of your procedure with a responsible adult. A taxi , Uber, etc, is not an option unless you are accompanied by a responsible adult. If you fail to arrange transportation with a responsible adult, your procedure will be cancelled and rescheduled.    Purchase the  following supplies at your local pharmacy:  - 2 (two) bisacodyl tablets: each tablet contains 5 mg.  (Dulcolax  laxative NOT Dulcolax  stool softener)   - 1 (one) 8.3 oz bottle of Polyethylene Glycol (PEG) 3350 Powder   (MiraLAX , Smooth LAX , ClearLAX  or equivalent)  - 64 oz Gatorade    Regular Gatorade, Gatorade G2 , Powerade , Powerade Zero  or Pedialyte  is acceptable. Red colored flavors are not allowed; all other colors (yellow, green, orange, purple and blue) are okay. It is also okay to buy two 2.12 oz packets of powdered Gatorade that can be  mixed with water to a total volume of 64 oz of liquid.  - 1 (one) 10 oz bottle of Magnesium Citrate (Red colored flavors are not allowed)  It is also okay for you to use a 0.5 oz package of powdered magnesium citrate (17 g) mixed with 10 oz of water.      PREPARATION FOR COLONOSCOPY    7 days before:    Discontinue fiber supplements and medications containing iron. This includes Metamucil  and Fibercon ; and multivitamins with iron.    3 days before:    Begin a low-fiber diet. A low-fiber diet helps making the cleanout more effective.     Examples of a low-fiber diet include (but are not limited to): white bread, white rice, pasta, crackers, fish, chicken, eggs, ground beef, creamy peanut butter, cooked/steamed/boiled vegetables, canned fruit, bananas, melons, milk, plain yogurt cheese, salad dressing and other condiments.     The following are not allowed on a low-fiber diet: seeds, nuts, popcorn, bran, whole wheat, corn, quinoa, raw fruits and vegetables, berries and dried fruit, beans and lentils.    For additional details on low-fiber diet, please refer to the table on the last page.    2 days before:    Continue the low-fiber diet.     Drink at least 8 glasses of water throughout the day.     Stop eating solid foods at 11:45 pm.    1 day before:    In the morning: begin a clear liquid diet (liquids you can see through).     Examples of a clear liquid diet include: water, clear broth or bouillon, Gatorade, Pedialyte or Powerade, carbonated and non-carbonated soft drinks (Sprite , 7-Up , ginger ale), strained fruit juices without pulp (apple, white grape, white cranberry), Jell-O  and popsicles.     The following are not allowed on a clear liquid diet: red liquids, alcoholic beverages, dairy products (milk, creamer, and yogurt), protein shakes, creamy broths, juice with pulp and chewing tobacco.    At noon: take 2 (two) bisacodyl tablets     At 4 (and no later than 6pm): start drinking the Miralax-Gatorade  preparation (8.3 oz of Miralax mixed with 64 oz of Gatorade in a large pitcher). Drink 1(one) 8 oz glass every 15 minutes thereafter, until the mixture is gone.    COLON CLEANSING TIPS: drink adequate amounts of fluids before and after your colon cleansing to prevent dehydration. Stay near a toilet because you will have diarrhea. Even if you are sitting on the toilet, continue to drink the cleansing solution every 15 minutes. If you feel nauseous or vomit, rinse your mouth with water, take a 15 to 30-minute-break and then continue drinking the solution. You will be uncomfortable until the stool has flushed from your colon (in about 2 to 4 hours). You may feel chilled.    Day of your procedure  You may take all of your morning medications including blood pressure medications, blood thinners (if you have not been instructed to stop these by our office), methadone, anti-seizure medications with sips of water 3 hours prior to your procedure or earlier. Do not take insulin or vitamins prior to your procedure. Continue the clear liquid diet.       4 hours prior: drink 10 oz of magnesium citrate. It may be easier to drink it with a straw.    STOP consuming all liquids after that.     Do not take anything by mouth during this time.     Allow extra time to travel to your procedure as you may need to stop and use a restroom along the way.    You are ready for the procedure, if you followed all instructions and your stool is no longer formed, but clear or yellow liquid. If you are unsure whether your colon is clean, please call our office at [phone] before you leave for your appointment.    Bring the following to your procedure:  - Insurance Card/Photo ID.   - List of current medications including over-the-counter medications and supplements.   - Your rescue inhaler if you currently use one to control asthma.    Canceling or rescheduling your appointment:   If you must cancel or reschedule your appointment, please call  [phone] as soon as possible.      COLONOSCOPY PRE-PROCEDURE CHECKLIST    If you have diabetes, ask your regular doctor for diet and medication restrictions.  If you take an anticoagulant or anti-platelet medication (such as Coumadin , Lovenox , Pradaxa , Xarelto , Eliquis , etc.), please call your primary doctor for advice on holding this medication.  If you take aspirin you may continue to do so.  If you are or may be pregnant, please discuss the risks and benefits of this procedure with your doctor.        What happens during a colonoscopy?    Plan to spend up to two hours, starting at registration time, at the endoscopy center the day of your procedure. The colonoscopy takes an average of 15 to 30 minutes. Recovery time is about 30 minutes.      Before the exam:    You will change into a gown.    Your medical history and medication list will be reviewed with you, unless that has been done over the phone prior to the procedure.     A nurse will insert an intravenous (IV) line into your hand or arm.    The doctor will meet with you and will give you a consent form to sign.  During the exam:     Medicine will be given through the IV line to help you relax.     Your heart rate and oxygen levels will be monitored. If your blood pressure is low, you may be given fluids through the IV line.     The doctor will insert a flexible hollow tube, called a colonoscope, into your rectum. The scope will be advanced slowly through the large intestine (colon).    You may have a feeling of fullness or pressure.     If an abnormal tissue or a polyp is found, the doctor may remove it through the endoscope for closer examination, or biopsy. Tissue removal is painless    After the exam:           Any tissue samples removed during the exam will be sent to a lab for evaluation. It may take 5-7 working days for you to be notified of the results.     A nurse will provide you with complete discharge instructions before you leave the  endoscopy center. Be sure to ask the nurse for specific instructions if you take blood thinners such as Aspirin, Coumadin or Plavix.     The doctor will prepare a full report for you and for the physician who referred you for the procedure.     Your doctor will talk with you about the initial results of your exam.      Medication given during the exam will prohibit you from driving for the rest of the day.     Following the exam, you may resume your normal diet. Your first meal should be light, no greasy foods. Avoid alcohol until the next day.     You may resume your regular activities the day after the procedure.         LOW-FIBER DIET    Foods RECOMMENDED Foods to AVOID   Breads, Cereal, Rice and Pasta:   White bread, rolls, biscuits, croissant and roxann toast.   Waffles, Latvian toast and pancakes.   White rice, noodles, pasta, macaroni and peeled cooked potatoes.   Plain crackers and saltines.   Cooked cereals: farina, cream of rice.   Cold cereals: Puffed Rice , Rice Krispies , Corn Flakes  and Special K    Breads, Cereal, Rice and Pasta:   Breads or rolls with nuts, seeds or fruit.   Whole wheat, pumpernickel, rye breads and cornbread.   Potatoes with skin, brown or wild rice, and kasha (buckwheat).     Vegetables:   Tender cooked and canned vegetables without seeds: carrots, asparagus tips, green or wax beans, pumpkin, spinach, lima beans. Vegetables:   Raw or steamed vegetables.   Vegetables with seeds.   Sauerkraut.   Winter squash, peas, broccoli, Brussel sprouts, cabbage, onions, cauliflower, baked beans, peas and corn.   Fruits:   Strained fruit juice.   Canned fruit, except pineapple.   Ripe bananas and melon. Fruits:   Prunes and prune juice.   Raw fruits.   Dried fruits: figs, dates and raisins.   Milk/Dairy:   Milk: plain or flavored.   Yogurt, custard and ice cream.   Cheese and cottage cheese Milk/Dairy:     Meat and other proteins:   ground, well-cooked tender beef, lamb, ham, veal, pork, fish,  poultry and organ meats.   Eggs.   Peanut butter without nuts. Meat and other proteins:   Tough, fibrous meats with gristle.   Dry beans, peas and lentils.   Peanut butter with nuts.   Tofu.   Fats, Snack, Sweets, Condiments and Beverages:   Margarine, butter, oils, mayonnaise, sour cream and salad dressing, plain gravy.   Sugar, hard candy, clear jelly, honey and syrup.   Spices, cooked herbs, bouillon, broth and soups made with allowed vegetable, ketchup and mustard.   Coffee, tea and carbonated drinks.   Plain cakes, cookies and pretzels.   Gelatin, plain puddings, custard, ice cream, sherbet and popsicles. Fats, Snack, Sweets, Condiments and Beverages:   Nuts, seeds and coconut.   Jam, marmalade and preserves.   Pickles, olives, relish and horseradish.   All desserts containing nuts, seeds, dried fruit and coconut; or made from whole grains or bran.   Candy made with nuts or seeds.   Popcorn.         DIRECTIONS TO THE ENDOSCOPY DEPARTMENT    From the north ([facility])  Take [address] South, exit on [address] [address]. Get into the left hand [place], turn left (east), go one-half mile to [address] and turn left. Go north to the second stoplight, take a right on [address] and follow it to the Main Hospital entrance.    From the south ([facility])  Take [address]N to the [address]E split and exit on [address] [address]. On [address] [address], turn left (west) to [address]. Turn right (north) on [address]. Go north to the second stoplight, take a right on [address] and follow it to the Main Hospital entrance.    From the east via 35E ([facility])  Take [address] south to [address] [address] exit. Turn right on [address] [address]. Go west to [address]. Turn right (north) on [address]. Go to the second stoplight, take a right on [address] to the Main Hospital entrance.    From the east via [address] ([facility])  Take [address] to [address]. Turn left  (south) on [address] to [address], turn left (east) on [address] and follow it to the Main Hospital entrance.    From the west via [address] 13 ([place])  Take [address] east to [address]. Turn right (south) on [address] to [address], turn left (east) on [address] and follow it to the Main Hospital entrance.

## 2021-02-23 NOTE — CONSULTS
Colon and Rectal Surgery Consultation         Pardeep Waite    MRN# [id number]   YOB: 1980 Age: 37 year old   Date of Admission: 4/27/2018  Date of Consult: 4/28/2018          Assessment and Plan:      37M admitted with recurrent diverticulitis, doing well with benign abdominal exam  -No need for surgical intervention  -upload CT imaging into system for further review  -continue slow diet advancement as tolerated  -continue abxs   -will need colonoscopy in 6-8 weeks after discharge   -can f/up with CRS clinic as outpatient  -rest of management as per primary team    Discussed with Dr Sequeira           Primary Care Physician:      No Ref-Primary, Physician None         Requesting Physician:      Dr Alvarez         Chief Complaint:      Recurrent diverticulitis    History is obtained from the patient.         History of Present Illness:      Pardeep Waite is a 37 year old male on enbrel for psoriatric arthritis and h/o diverticulitis in 2014 (w/ contained perforation; admission then c/b sepsis) who was admitted yesterday with recurrent episode of sigmoid diverticulitis for which Colorectal Surgery is consulted.  Pt reports being in his usual state of health until last Sunday when he started having some LLQ discomfort. This got progressively worse prompting visit to Urgent Care on Tues. Was sent home on Cipro/Flagyl but given minimal improvements, he presented again to Urgent Care yesterday for eval. Work-up notable for mild leukocytosis of 11 and sigmoid diverticulitis with ? small early intramural abscess for which he was admitted to [facility] and started on IV Zosyn.    At time of visit this am, pt reports complete resolution of his abdominal pain. Has had no fever or chills. Is passing tons of gas. Was started on clear liquid diet which he is tolerating with no N/V. Repeat WBC this am 8.7. Has not had a colonoscopy in the past              Past Medical History:        Past Medical History:   Diagnosis Date  "    Arthritis              Past Surgical History:      No past surgical history on file.              Home Medications:        Prior to Admission medications    Medication Sig Last Dose Taking? Auth Provider   ciprofloxacin (CIPRO) 500 MG tablet Take 500 mg by mouth 2 times daily x10 days 2018 at am Yes Unknown, Entered By History   etanercept (ENBREL) 50 MG/ML injection Inject Subcutaneous once a week 2018 at Unknown time Yes Unknown, Entered By History   metroNIDAZOLE (FLAGYL) 500 MG tablet Take 500 mg by mouth 2 times daily x10 days 2018 at am Yes Unknown, Entered By History            Current Medications:           piperacillin-tazobactam  3.375 g Intravenous Q6H             Allergies:   No Known Allergies         Social History:        Social History   Substance Use Topics     Smoking status: Current Some Day Smoker     Smokeless tobacco: Not on file     Alcohol use Yes             Family History:      No family history on file.          Review of Systems:      The 10 point Review of Systems is negative other than noted in the HPI.            Physical Exam:      Blood pressure 121/66, temperature 98.4  F (36.9  C), resp. rate 14, height 1.854 m (6' 1\"), weight 116.3 kg (256 lb 8 oz), SpO2 96 %.  Vitals:    18   Weight: 116.3 kg (256 lb 8 oz)     Vital Sign Ranges  Temperature Temp  Av.9  F (37.2  C)  Min: 98.4  F (36.9  C)  Max: 99.8  F (37.7  C)   Blood pressure Systolic (24hrs), Av , Min:116 , Max:130        Diastolic (24hrs), Av, Min:57, Max:73      Pulse No Data Recorded   Respirations Resp  Av  Min: 14  Max: 18   Pulse oximetry SpO2  Av %  Min: 96 %  Max: 96 %         Intake/Output Summary (Last 24 hours) at 18 1012  Last data filed at 18 0600   Gross per 24 hour   Intake              852 ml   Output                0 ml   Net              852 ml       Constitutional: Awake, alert, cooperative, no apparent distress   Psych: Alert, oriented to " person, place and time, no obvious anxiety or depression   Neuro: Cranial nerves 2-12 intact, normal strength and sensation.   Eyes: Conjunctiva and pupils examined and normal.   ENT: Moist mucous membranes, normal dentition.   GI: soft, non-distended, non-tender,    Skin: No rashes, no cyanosis, no edema   Musculoskeletal: No joint swelling, erythema or tenderness.          Data:      All new lab data was reviewed.  CT unavailable  Recent Labs   Lab Test  04/28/18   0554  12/23/16   1529  06/28/14   0647   WBC  8.7  9.5  10.3   HGB  13.0*  14.5  12.1*   PLT  272  242  192      Recent Labs   Lab Test  04/28/18   0554  06/28/14   0647  06/27/14   1820   NA  137  140  139   POTASSIUM  3.8  4.0  3.9   CHLORIDE  102  106  100   CO2  28  26  24   BUN  12  16  16   CR  0.93  0.90  0.83   ANIONGAP  7  8  14   SERENE  8.8  8.3*  9.4   GLC  107*  94  104*       Keyonna Quach MD  [facility]     Acitretin Counseling:  I discussed with the patient the risks of acitretin including but not limited to hair loss, dry lips/skin/eyes, liver damage, hyperlipidemia, depression/suicidal ideation, photosensitivity.  Serious rare side effects can include but are not limited to pancreatitis, pseudotumor cerebri, bony changes, clot formation/stroke/heart attack.  Patient understands that alcohol is contraindicated since it can result in liver toxicity and significantly prolong the elimination of the drug by many years.

## 2021-08-23 ENCOUNTER — HOSPITAL ENCOUNTER (EMERGENCY)
Facility: CLINIC | Age: 41
Discharge: HOME | End: 2021-08-24
Attending: EMERGENCY MEDICINE | Admitting: EMERGENCY MEDICINE
Payer: COMMERCIAL

## 2021-08-23 DIAGNOSIS — J12.82 PNEUMONIA DUE TO 2019 NOVEL CORONAVIRUS: ICD-10-CM

## 2021-08-23 DIAGNOSIS — U07.1 PNEUMONIA DUE TO 2019 NOVEL CORONAVIRUS: ICD-10-CM

## 2021-08-23 LAB
ANION GAP SERPL CALCULATED.3IONS-SCNC: 4 MMOL/L (ref 3–14)
BASOPHILS # BLD AUTO: 0 10E3/UL (ref 0–0.2)
BASOPHILS NFR BLD AUTO: 0 %
BUN SERPL-MCNC: 17 MG/DL (ref 7–30)
CALCIUM SERPL-MCNC: 8.9 MG/DL (ref 8.5–10.1)
CHLORIDE BLD-SCNC: 102 MMOL/L (ref 94–109)
CO2 SERPL-SCNC: 29 MMOL/L (ref 20–32)
CREAT SERPL-MCNC: 0.85 MG/DL (ref 0.66–1.25)
D DIMER PPP FEU-MCNC: 0.64 UG/ML FEU (ref 0–0.5)
EOSINOPHIL # BLD AUTO: 0 10E3/UL (ref 0–0.7)
EOSINOPHIL NFR BLD AUTO: 0 %
ERYTHROCYTE [DISTWIDTH] IN BLOOD BY AUTOMATED COUNT: 12.4 % (ref 10–15)
GFR SERPL CREATININE-BSD FRML MDRD: >90 ML/MIN/1.73M2
GLUCOSE BLD-MCNC: 111 MG/DL (ref 70–99)
HCT VFR BLD AUTO: 46.4 % (ref 40–53)
HGB BLD-MCNC: 15.4 G/DL (ref 13.3–17.7)
IMM GRANULOCYTES # BLD: 0.1 10E3/UL
IMM GRANULOCYTES NFR BLD: 1 %
LYMPHOCYTES # BLD AUTO: 1 10E3/UL (ref 0.8–5.3)
LYMPHOCYTES NFR BLD AUTO: 18 %
MCH RBC QN AUTO: 28.7 PG (ref 26.5–33)
MCHC RBC AUTO-ENTMCNC: 33.2 G/DL (ref 31.5–36.5)
MCV RBC AUTO: 87 FL (ref 78–100)
MONOCYTES # BLD AUTO: 0.7 10E3/UL (ref 0–1.3)
MONOCYTES NFR BLD AUTO: 13 %
NEUTROPHILS # BLD AUTO: 3.8 10E3/UL (ref 1.6–8.3)
NEUTROPHILS NFR BLD AUTO: 68 %
NRBC # BLD AUTO: 0 10E3/UL
NRBC BLD AUTO-RTO: 0 /100
NT-PROBNP SERPL-MCNC: 11 PG/ML (ref 0–450)
PLATELET # BLD AUTO: 232 10E3/UL (ref 150–450)
POTASSIUM BLD-SCNC: 4.1 MMOL/L (ref 3.4–5.3)
RBC # BLD AUTO: 5.36 10E6/UL (ref 4.4–5.9)
SODIUM SERPL-SCNC: 135 MMOL/L (ref 133–144)
TROPONIN I SERPL-MCNC: <0.015 UG/L (ref 0–0.04)
WBC # BLD AUTO: 5.6 10E3/UL (ref 4–11)

## 2021-08-23 PROCEDURE — 258N000003 HC RX IP 258 OP 636: Performed by: EMERGENCY MEDICINE

## 2021-08-23 PROCEDURE — 94640 AIRWAY INHALATION TREATMENT: CPT

## 2021-08-23 PROCEDURE — 99285 EMERGENCY DEPT VISIT HI MDM: CPT | Mod: 25

## 2021-08-23 PROCEDURE — 96375 TX/PRO/DX INJ NEW DRUG ADDON: CPT

## 2021-08-23 PROCEDURE — 250N000013 HC RX MED GY IP 250 OP 250 PS 637: Performed by: EMERGENCY MEDICINE

## 2021-08-23 PROCEDURE — 36415 COLL VENOUS BLD VENIPUNCTURE: CPT | Performed by: EMERGENCY MEDICINE

## 2021-08-23 PROCEDURE — 85379 FIBRIN DEGRADATION QUANT: CPT | Performed by: EMERGENCY MEDICINE

## 2021-08-23 PROCEDURE — 96374 THER/PROPH/DIAG INJ IV PUSH: CPT | Mod: 59

## 2021-08-23 PROCEDURE — 85025 COMPLETE CBC W/AUTO DIFF WBC: CPT | Performed by: EMERGENCY MEDICINE

## 2021-08-23 PROCEDURE — 93005 ELECTROCARDIOGRAM TRACING: CPT

## 2021-08-23 PROCEDURE — 83880 ASSAY OF NATRIURETIC PEPTIDE: CPT | Performed by: EMERGENCY MEDICINE

## 2021-08-23 PROCEDURE — 80048 BASIC METABOLIC PNL TOTAL CA: CPT | Performed by: EMERGENCY MEDICINE

## 2021-08-23 PROCEDURE — 84484 ASSAY OF TROPONIN QUANT: CPT | Performed by: EMERGENCY MEDICINE

## 2021-08-23 PROCEDURE — 96361 HYDRATE IV INFUSION ADD-ON: CPT

## 2021-08-23 RX ORDER — ALBUTEROL SULFATE 90 UG/1
6 AEROSOL, METERED RESPIRATORY (INHALATION) ONCE
Status: COMPLETED | OUTPATIENT
Start: 2021-08-23 | End: 2021-08-23

## 2021-08-23 RX ADMIN — ALBUTEROL SULFATE 6 PUFF: 90 AEROSOL, METERED RESPIRATORY (INHALATION) at 23:48

## 2021-08-23 RX ADMIN — SODIUM CHLORIDE 1000 ML: 9 INJECTION, SOLUTION INTRAVENOUS at 23:49

## 2021-08-23 ASSESSMENT — MIFFLIN-ST. JEOR: SCORE: 2151.38

## 2021-08-23 NOTE — ED TRIAGE NOTES
"Pt presents for evaluation of worsening COVID symptoms. Symptoms started Saturday night (chest pain, coughing, fever, diarrhea). Had a positive COVID on Sunday. All week, \"massive fatigue\", body aches. Yesterday shortness of breath. Today nausea. Cough is productive. Symptoms worse when lying down.   "

## 2021-08-24 ENCOUNTER — APPOINTMENT (OUTPATIENT)
Dept: CT IMAGING | Facility: CLINIC | Age: 41
End: 2021-08-24
Attending: EMERGENCY MEDICINE
Payer: COMMERCIAL

## 2021-08-24 VITALS
HEIGHT: 73 IN | HEART RATE: 94 BPM | SYSTOLIC BLOOD PRESSURE: 134 MMHG | OXYGEN SATURATION: 92 % | BODY MASS INDEX: 34.99 KG/M2 | DIASTOLIC BLOOD PRESSURE: 93 MMHG | RESPIRATION RATE: 22 BRPM | WEIGHT: 264 LBS | TEMPERATURE: 99.7 F

## 2021-08-24 LAB
ATRIAL RATE - MUSE: 99 BPM
DIASTOLIC BLOOD PRESSURE - MUSE: NORMAL MMHG
INTERPRETATION ECG - MUSE: NORMAL
P AXIS - MUSE: 54 DEGREES
PR INTERVAL - MUSE: 140 MS
QRS DURATION - MUSE: 98 MS
QT - MUSE: 340 MS
QTC - MUSE: 436 MS
R AXIS - MUSE: 52 DEGREES
SYSTOLIC BLOOD PRESSURE - MUSE: NORMAL MMHG
T AXIS - MUSE: 65 DEGREES
TROPONIN T BLD-MCNC: 0 UG/L
VENTRICULAR RATE- MUSE: 99 BPM

## 2021-08-24 PROCEDURE — 250N000011 HC RX IP 250 OP 636: Performed by: EMERGENCY MEDICINE

## 2021-08-24 PROCEDURE — 84484 ASSAY OF TROPONIN QUANT: CPT

## 2021-08-24 PROCEDURE — 71275 CT ANGIOGRAPHY CHEST: CPT

## 2021-08-24 PROCEDURE — 258N000003 HC RX IP 258 OP 636: Performed by: EMERGENCY MEDICINE

## 2021-08-24 RX ORDER — ALBUTEROL SULFATE 90 UG/1
2 AEROSOL, METERED RESPIRATORY (INHALATION) EVERY 4 HOURS PRN
Qty: 8 G | Refills: 0 | Status: SHIPPED | OUTPATIENT
Start: 2021-08-24

## 2021-08-24 RX ORDER — ONDANSETRON 2 MG/ML
4 INJECTION INTRAMUSCULAR; INTRAVENOUS ONCE
Status: COMPLETED | OUTPATIENT
Start: 2021-08-24 | End: 2021-08-24

## 2021-08-24 RX ORDER — DIPHENHYDRAMINE HYDROCHLORIDE 50 MG/ML
50 INJECTION INTRAMUSCULAR; INTRAVENOUS ONCE
Status: COMPLETED | OUTPATIENT
Start: 2021-08-24 | End: 2021-08-24

## 2021-08-24 RX ORDER — IOPAMIDOL 755 MG/ML
500 INJECTION, SOLUTION INTRAVASCULAR ONCE
Status: COMPLETED | OUTPATIENT
Start: 2021-08-24 | End: 2021-08-24

## 2021-08-24 RX ORDER — METHYLPREDNISOLONE SODIUM SUCCINATE 125 MG/2ML
125 INJECTION, POWDER, LYOPHILIZED, FOR SOLUTION INTRAMUSCULAR; INTRAVENOUS ONCE
Status: COMPLETED | OUTPATIENT
Start: 2021-08-24 | End: 2021-08-24

## 2021-08-24 RX ADMIN — METHYLPREDNISOLONE SODIUM SUCCINATE 125 MG: 125 INJECTION, POWDER, FOR SOLUTION INTRAMUSCULAR; INTRAVENOUS at 01:36

## 2021-08-24 RX ADMIN — DIPHENHYDRAMINE HYDROCHLORIDE 50 MG: 50 INJECTION, SOLUTION INTRAMUSCULAR; INTRAVENOUS at 01:36

## 2021-08-24 RX ADMIN — IOPAMIDOL 75 ML: 755 INJECTION, SOLUTION INTRAVENOUS at 01:35

## 2021-08-24 RX ADMIN — SODIUM CHLORIDE 90 ML: 9 INJECTION, SOLUTION INTRAVENOUS at 01:35

## 2021-08-24 RX ADMIN — ONDANSETRON 4 MG: 2 INJECTION INTRAMUSCULAR; INTRAVENOUS at 01:50

## 2021-08-24 NOTE — ED PROVIDER NOTES
"  History   Chief Complaint:  Shortness of Breath       The history is provided by the patient.      Pardeep Waite is a 40 year old male who was diagnosed with Covid 10 days ago who presents with increasing shortness of breath and cough.  He states over the past 48 hours his cough has markedly worsened and over the past 24 hours his shortness of breath has worsened.  Both of them do not appear to be positional but appear to be worsened with any exertion, even minimal walking or humidity.  He denies any worsening of his fever (though notes continued fevers and myalgias and arthralgias) or any chest pain.  There is no calf pain or calf swelling.    Review of Systems  Positive - shortness of breath, cough, fevers, myalgias  Negative - chest pain, nausea, vomiting  Remaining 10 point ROS negative    Allergies:  Contrast dye    Medications:  Zyloprim  Mitigare  Neurontin  Ativan    Past Medical History:    Diverticulitis  Gout  Osteoarthritis of bilateral knees  Psoriatic arthritis  Anxiety     Family History:    Father: kidney disease    Social History:  Presents to the ED alone.    Physical Exam     Patient Vitals for the past 24 hrs:   BP Temp Temp src Pulse Resp SpO2 Height Weight   08/24/21 0210 -- -- -- -- -- 92 % -- --   08/24/21 0200 (!) 134/93 -- -- 94 -- 92 % -- --   08/24/21 0150 131/89 -- -- 95 -- 95 % -- --   08/24/21 0100 -- -- -- 98 -- 92 % -- --   08/24/21 0030 -- -- -- -- -- 93 % -- --   08/23/21 2330 -- -- -- 98 -- 94 % -- --   08/23/21 2305 (!) 140/93 -- -- 98 -- 94 % -- --   08/23/21 1901 136/84 99.7  F (37.6  C) Oral 113 22 96 % 1.854 m (6' 1\") 119.7 kg (264 lb)       Physical Exam  General/Appearance: appears stated age, well-groomed, appears to not feel well  Eyes: EOMI, no scleral injection, no icterus  ENT: MMM  Neck: supple, nl ROM, no stiffness  Cardiovascular: tachy but regular, nl S1S2, no m/r/g, 2+ pulses in all 4 extremities, cap refill <2sec  Respiratory: CTAB, good air movement " throughout, no wheezes/rhonchi/rales, no increased WOB, no retractions  GI: abd soft, non-distended, nttp,  no HSM, no rebound, no guarding, nl BS  MSK: KAUFMAN, good tone, no bony abnormality  Skin: warm and well-perfused, no rash, no edema, no ecchymosis, nl turgor  Neuro: GCS 15, alert and oriented, no gross focal neuro deficits  Psych: interacts appropriately  Heme: no petechia, no purpura, no active bleeding        Emergency Department Course   ECG  ECG taken at 1907, ECG read at 1911  Normal sinus rhythm.  Incomplete right bundle branch block.  Borderline ECG.   Rate 99 bpm. SC interval 140 ms. QRS duration 98 ms. QT/QTc 340/436 ms. P-R-T axes 54 52 65.     Imaging:  CT Chest Pulmonary Embolism w Contrast   Final Result   IMPRESSION:   1.  No pulmonary embolus.   2.  Moderate bilateral pulmonary infiltrates consistent with COVID 19 pneumonitis.        Laboratory:  CBC: WBC 5.6, HGB 15.4,      BMP: Glucose 111 (H) o/w WNL (Creatinine: 0.85)    Troponin (Collected 2303): <0.015    D Dimer (Collected 2303): 0.64 (H)    BNP: 11    Emergency Department Course:    Reviewed:  I reviewed nursing notes, vitals, past medical history and care everywhere    Assessments:  2302 I obtained history and examined the patient as noted above.   0219 I rechecked the patient and explained findings.     Interventions:  2348 Albuterol inhaler 6 puff PO  2349 NS 1L IV Bolus  0135 NS 1L IV Bolus  0136 Solu-Medrol 125 mg IV  0136 Benadryl 50 mg IV  0150 Zofran 4 mg IV    Disposition:  The patient was discharged to home.       Impression & Plan     CMS Diagnoses: None    Medical Decision Making:  This patient is a 40-year-old male who was diagnosed with Covid about 10 days ago who presents with increasing cough over the past 2 days with increasing shortness of breath over the past day.  I was concerned that potentially he had complications of Covid such as a new bacterial pneumonia that was superimposed on the Covid, PE.  I also  considered unrelated pathologies such as ACS.  Thankfully his delta troponin is unremarkable and EKG shows normal sinus rhythm without ischemic changes.  His D-dimer was mildly bumped so this was followed up by a CT scan.  This came back is only positive for abnormalities consistent with Covid.  He actually felt markedly better after the albuterol nebs and is maintaining his oxygen saturations.  I suspect all of his symptoms are secondary to Covid and reactive inflammation.  I think he will benefit from an albuterol inhaler at home.  At this point in time he still is satting above 92% so I do not think oxygen or steroids are indicated.  He feels comfortable with the plan and with discharge.    Covid-19  Pardeep Waite was evaluated during a global COVID-19 pandemic, which necessitated consideration that the patient might be at risk for infection with the SARS-CoV-2 virus that causes COVID-19.   Applicable protocols for evaluation were followed during the patient's care.   COVID-19 was considered as part of the patient's evaluation. The plan for testing is:  a test was obtained at a previous visit and reviewed & considered today.    Diagnosis:    ICD-10-CM    1. Pneumonia due to 2019 novel coronavirus  U07.1     J12.82        Discharge Medications:  New Prescriptions    ALBUTEROL (PROAIR HFA/PROVENTIL HFA/VENTOLIN HFA) 108 (90 BASE) MCG/ACT INHALER    Inhale 2 puffs into the lungs every 4 hours as needed for shortness of breath / dyspnea or wheezing       Scribe Disclosure:  I, Kathi James, am serving as a scribe at 10:59 PM on 8/23/2021 to document services personally performed by Myrtle Mcnally MD based on my observations and the provider's statements to me.            Myrtle Mcnally MD  08/24/21 8917

## 2021-09-27 ENCOUNTER — HOSPITAL ENCOUNTER (INPATIENT)
Facility: CLINIC | Age: 41
LOS: 3 days | Discharge: HOME | DRG: 871 | End: 2021-09-30
Attending: EMERGENCY MEDICINE | Admitting: INTERNAL MEDICINE
Payer: COMMERCIAL

## 2021-09-27 ENCOUNTER — APPOINTMENT (OUTPATIENT)
Dept: CT IMAGING | Facility: CLINIC | Age: 41
DRG: 871 | End: 2021-09-27
Attending: EMERGENCY MEDICINE
Payer: COMMERCIAL

## 2021-09-27 DIAGNOSIS — A41.9 SEPSIS, DUE TO UNSPECIFIED ORGANISM, UNSPECIFIED WHETHER ACUTE ORGAN DYSFUNCTION PRESENT (H): ICD-10-CM

## 2021-09-27 DIAGNOSIS — K57.92 ACUTE DIVERTICULITIS: ICD-10-CM

## 2021-09-27 LAB
ALBUMIN SERPL-MCNC: 4 G/DL (ref 3.4–5)
ALBUMIN UR-MCNC: 50 MG/DL
ALP SERPL-CCNC: 105 U/L (ref 40–150)
ALT SERPL W P-5'-P-CCNC: 85 U/L (ref 0–70)
ANION GAP SERPL CALCULATED.3IONS-SCNC: 9 MMOL/L (ref 3–14)
APPEARANCE UR: CLEAR
AST SERPL W P-5'-P-CCNC: 51 U/L (ref 0–45)
BASOPHILS # BLD AUTO: 0.1 10E3/UL (ref 0–0.2)
BASOPHILS NFR BLD AUTO: 0 %
BILIRUB SERPL-MCNC: 1.4 MG/DL (ref 0.2–1.3)
BILIRUB UR QL STRIP: NEGATIVE
BUN SERPL-MCNC: 9 MG/DL (ref 7–30)
CALCIUM SERPL-MCNC: 9.2 MG/DL (ref 8.5–10.1)
CHLORIDE BLD-SCNC: 101 MMOL/L (ref 94–109)
CO2 SERPL-SCNC: 27 MMOL/L (ref 20–32)
COLOR UR AUTO: YELLOW
CREAT SERPL-MCNC: 0.91 MG/DL (ref 0.66–1.25)
EOSINOPHIL # BLD AUTO: 0 10E3/UL (ref 0–0.7)
EOSINOPHIL NFR BLD AUTO: 0 %
ERYTHROCYTE [DISTWIDTH] IN BLOOD BY AUTOMATED COUNT: 13.3 % (ref 10–15)
GFR SERPL CREATININE-BSD FRML MDRD: >90 ML/MIN/1.73M2
GLUCOSE BLD-MCNC: 114 MG/DL (ref 70–99)
GLUCOSE UR STRIP-MCNC: NEGATIVE MG/DL
HCT VFR BLD AUTO: 46.4 % (ref 40–53)
HGB BLD-MCNC: 15 G/DL (ref 13.3–17.7)
HGB UR QL STRIP: NEGATIVE
IMM GRANULOCYTES # BLD: 0.2 10E3/UL
IMM GRANULOCYTES NFR BLD: 1 %
KETONES UR STRIP-MCNC: NEGATIVE MG/DL
LACTATE SERPL-SCNC: 1.8 MMOL/L (ref 0.7–2)
LEUKOCYTE ESTERASE UR QL STRIP: NEGATIVE
LYMPHOCYTES # BLD AUTO: 1.7 10E3/UL (ref 0.8–5.3)
LYMPHOCYTES NFR BLD AUTO: 8 %
MCH RBC QN AUTO: 28.6 PG (ref 26.5–33)
MCHC RBC AUTO-ENTMCNC: 32.3 G/DL (ref 31.5–36.5)
MCV RBC AUTO: 89 FL (ref 78–100)
MONOCYTES # BLD AUTO: 2.8 10E3/UL (ref 0–1.3)
MONOCYTES NFR BLD AUTO: 13 %
MUCOUS THREADS #/AREA URNS LPF: PRESENT /LPF
NEUTROPHILS # BLD AUTO: 17.4 10E3/UL (ref 1.6–8.3)
NEUTROPHILS NFR BLD AUTO: 78 %
NITRATE UR QL: NEGATIVE
NRBC # BLD AUTO: 0 10E3/UL
NRBC BLD AUTO-RTO: 0 /100
PH UR STRIP: 7.5 [PH] (ref 5–7)
PLATELET # BLD AUTO: 282 10E3/UL (ref 150–450)
POTASSIUM BLD-SCNC: 3.8 MMOL/L (ref 3.4–5.3)
PROT SERPL-MCNC: 8.4 G/DL (ref 6.8–8.8)
RBC # BLD AUTO: 5.24 10E6/UL (ref 4.4–5.9)
RBC URINE: 1 /HPF
SARS-COV-2 RNA RESP QL NAA+PROBE: POSITIVE
SODIUM SERPL-SCNC: 137 MMOL/L (ref 133–144)
SP GR UR STRIP: 1 (ref 1–1.03)
SQUAMOUS EPITHELIAL: <1 /HPF
UROBILINOGEN UR STRIP-MCNC: NORMAL MG/DL
WBC # BLD AUTO: 22.1 10E3/UL (ref 4–11)
WBC URINE: 1 /HPF

## 2021-09-27 PROCEDURE — 74177 CT ABD & PELVIS W/CONTRAST: CPT

## 2021-09-27 PROCEDURE — 96376 TX/PRO/DX INJ SAME DRUG ADON: CPT

## 2021-09-27 PROCEDURE — 250N000009 HC RX 250: Performed by: EMERGENCY MEDICINE

## 2021-09-27 PROCEDURE — 96361 HYDRATE IV INFUSION ADD-ON: CPT

## 2021-09-27 PROCEDURE — 120N000001 HC R&B MED SURG/OB

## 2021-09-27 PROCEDURE — 85025 COMPLETE CBC W/AUTO DIFF WBC: CPT | Performed by: EMERGENCY MEDICINE

## 2021-09-27 PROCEDURE — 83605 ASSAY OF LACTIC ACID: CPT | Performed by: EMERGENCY MEDICINE

## 2021-09-27 PROCEDURE — 87635 SARS-COV-2 COVID-19 AMP PRB: CPT | Performed by: EMERGENCY MEDICINE

## 2021-09-27 PROCEDURE — 250N000013 HC RX MED GY IP 250 OP 250 PS 637: Performed by: EMERGENCY MEDICINE

## 2021-09-27 PROCEDURE — 250N000011 HC RX IP 250 OP 636: Performed by: PHYSICIAN ASSISTANT

## 2021-09-27 PROCEDURE — 36415 COLL VENOUS BLD VENIPUNCTURE: CPT | Performed by: EMERGENCY MEDICINE

## 2021-09-27 PROCEDURE — 87040 BLOOD CULTURE FOR BACTERIA: CPT | Performed by: EMERGENCY MEDICINE

## 2021-09-27 PROCEDURE — 96375 TX/PRO/DX INJ NEW DRUG ADDON: CPT

## 2021-09-27 PROCEDURE — 250N000011 HC RX IP 250 OP 636: Performed by: EMERGENCY MEDICINE

## 2021-09-27 PROCEDURE — 96365 THER/PROPH/DIAG IV INF INIT: CPT | Mod: 59

## 2021-09-27 PROCEDURE — 258N000003 HC RX IP 258 OP 636: Performed by: EMERGENCY MEDICINE

## 2021-09-27 PROCEDURE — 258N000003 HC RX IP 258 OP 636: Performed by: PHYSICIAN ASSISTANT

## 2021-09-27 PROCEDURE — 99223 1ST HOSP IP/OBS HIGH 75: CPT | Mod: AI | Performed by: PHYSICIAN ASSISTANT

## 2021-09-27 PROCEDURE — 250N000009 HC RX 250: Performed by: PHYSICIAN ASSISTANT

## 2021-09-27 PROCEDURE — 81001 URINALYSIS AUTO W/SCOPE: CPT | Performed by: PHYSICIAN ASSISTANT

## 2021-09-27 PROCEDURE — 82040 ASSAY OF SERUM ALBUMIN: CPT | Performed by: EMERGENCY MEDICINE

## 2021-09-27 PROCEDURE — 99285 EMERGENCY DEPT VISIT HI MDM: CPT | Mod: 25

## 2021-09-27 PROCEDURE — 250N000013 HC RX MED GY IP 250 OP 250 PS 637: Performed by: PHYSICIAN ASSISTANT

## 2021-09-27 PROCEDURE — 250N000011 HC RX IP 250 OP 636: Performed by: HOSPITALIST

## 2021-09-27 PROCEDURE — C9803 HOPD COVID-19 SPEC COLLECT: HCPCS

## 2021-09-27 RX ORDER — HYDROMORPHONE HYDROCHLORIDE 1 MG/ML
.5-1 INJECTION, SOLUTION INTRAMUSCULAR; INTRAVENOUS; SUBCUTANEOUS
Status: DISCONTINUED | OUTPATIENT
Start: 2021-09-28 | End: 2021-09-30 | Stop reason: HOSPADM

## 2021-09-27 RX ORDER — NALOXONE HYDROCHLORIDE 0.4 MG/ML
0.4 INJECTION, SOLUTION INTRAMUSCULAR; INTRAVENOUS; SUBCUTANEOUS
Status: DISCONTINUED | OUTPATIENT
Start: 2021-09-27 | End: 2021-09-30 | Stop reason: HOSPADM

## 2021-09-27 RX ORDER — ONDANSETRON 4 MG/1
4 TABLET, ORALLY DISINTEGRATING ORAL EVERY 6 HOURS PRN
Status: DISCONTINUED | OUTPATIENT
Start: 2021-09-27 | End: 2021-09-30 | Stop reason: HOSPADM

## 2021-09-27 RX ORDER — MORPHINE SULFATE 4 MG/ML
4 INJECTION, SOLUTION INTRAMUSCULAR; INTRAVENOUS ONCE
Status: DISCONTINUED | OUTPATIENT
Start: 2021-09-27 | End: 2021-09-27

## 2021-09-27 RX ORDER — MORPHINE SULFATE 4 MG/ML
4 INJECTION, SOLUTION INTRAMUSCULAR; INTRAVENOUS ONCE
Status: COMPLETED | OUTPATIENT
Start: 2021-09-27 | End: 2021-09-27

## 2021-09-27 RX ORDER — IOPAMIDOL 755 MG/ML
100 INJECTION, SOLUTION INTRAVASCULAR ONCE
Status: COMPLETED | OUTPATIENT
Start: 2021-09-27 | End: 2021-09-27

## 2021-09-27 RX ORDER — HYDROMORPHONE HYDROCHLORIDE 1 MG/ML
.3-.5 INJECTION, SOLUTION INTRAMUSCULAR; INTRAVENOUS; SUBCUTANEOUS
Status: DISCONTINUED | OUTPATIENT
Start: 2021-09-27 | End: 2021-09-27

## 2021-09-27 RX ORDER — LIDOCAINE 40 MG/G
CREAM TOPICAL
Status: DISCONTINUED | OUTPATIENT
Start: 2021-09-27 | End: 2021-09-30 | Stop reason: HOSPADM

## 2021-09-27 RX ORDER — NALOXONE HYDROCHLORIDE 0.4 MG/ML
0.2 INJECTION, SOLUTION INTRAMUSCULAR; INTRAVENOUS; SUBCUTANEOUS
Status: DISCONTINUED | OUTPATIENT
Start: 2021-09-27 | End: 2021-09-30 | Stop reason: HOSPADM

## 2021-09-27 RX ORDER — LORAZEPAM 1 MG/1
1 TABLET ORAL EVERY 6 HOURS PRN
COMMUNITY

## 2021-09-27 RX ORDER — OXYCODONE HYDROCHLORIDE 5 MG/1
5-10 TABLET ORAL EVERY 4 HOURS PRN
Status: DISCONTINUED | OUTPATIENT
Start: 2021-09-27 | End: 2021-09-30 | Stop reason: HOSPADM

## 2021-09-27 RX ORDER — ACETAMINOPHEN 650 MG/1
650 SUPPOSITORY RECTAL EVERY 6 HOURS PRN
Status: DISCONTINUED | OUTPATIENT
Start: 2021-09-27 | End: 2021-09-30 | Stop reason: HOSPADM

## 2021-09-27 RX ORDER — ONDANSETRON 2 MG/ML
4 INJECTION INTRAMUSCULAR; INTRAVENOUS
Status: COMPLETED | OUTPATIENT
Start: 2021-09-27 | End: 2021-09-27

## 2021-09-27 RX ORDER — OXYCODONE HYDROCHLORIDE 5 MG/1
5 TABLET ORAL EVERY 4 HOURS PRN
Status: DISCONTINUED | OUTPATIENT
Start: 2021-09-27 | End: 2021-09-27

## 2021-09-27 RX ORDER — SODIUM CHLORIDE 9 MG/ML
INJECTION, SOLUTION INTRAVENOUS CONTINUOUS
Status: DISCONTINUED | OUTPATIENT
Start: 2021-09-27 | End: 2021-09-30 | Stop reason: HOSPADM

## 2021-09-27 RX ORDER — ACETAMINOPHEN 500 MG
1000 TABLET ORAL ONCE
Status: COMPLETED | OUTPATIENT
Start: 2021-09-27 | End: 2021-09-27

## 2021-09-27 RX ORDER — ACETAMINOPHEN 325 MG/1
975 TABLET ORAL EVERY 8 HOURS PRN
Status: DISCONTINUED | OUTPATIENT
Start: 2021-09-27 | End: 2021-09-30 | Stop reason: HOSPADM

## 2021-09-27 RX ORDER — LORAZEPAM 1 MG/1
1 TABLET ORAL EVERY 6 HOURS PRN
Status: DISCONTINUED | OUTPATIENT
Start: 2021-09-27 | End: 2021-09-30 | Stop reason: HOSPADM

## 2021-09-27 RX ORDER — ONDANSETRON 2 MG/ML
4 INJECTION INTRAMUSCULAR; INTRAVENOUS EVERY 6 HOURS PRN
Status: DISCONTINUED | OUTPATIENT
Start: 2021-09-27 | End: 2021-09-30 | Stop reason: HOSPADM

## 2021-09-27 RX ADMIN — HYDROMORPHONE HYDROCHLORIDE 0.5 MG: 1 INJECTION, SOLUTION INTRAMUSCULAR; INTRAVENOUS; SUBCUTANEOUS at 16:31

## 2021-09-27 RX ADMIN — TAZOBACTAM SODIUM AND PIPERACILLIN SODIUM 4.5 G: 500; 4 INJECTION, SOLUTION INTRAVENOUS at 17:32

## 2021-09-27 RX ADMIN — SODIUM CHLORIDE 65 ML: 9 INJECTION, SOLUTION INTRAVENOUS at 11:51

## 2021-09-27 RX ADMIN — IOPAMIDOL 100 ML: 755 INJECTION, SOLUTION INTRAVENOUS at 11:50

## 2021-09-27 RX ADMIN — HYDROMORPHONE HYDROCHLORIDE 0.5 MG: 1 INJECTION, SOLUTION INTRAMUSCULAR; INTRAVENOUS; SUBCUTANEOUS at 19:00

## 2021-09-27 RX ADMIN — SODIUM CHLORIDE 1000 ML: 9 INJECTION, SOLUTION INTRAVENOUS at 10:54

## 2021-09-27 RX ADMIN — OXYCODONE HYDROCHLORIDE 5 MG: 5 TABLET ORAL at 17:18

## 2021-09-27 RX ADMIN — MORPHINE SULFATE 4 MG: 4 INJECTION INTRAVENOUS at 11:42

## 2021-09-27 RX ADMIN — ACETAMINOPHEN 1000 MG: 500 TABLET, FILM COATED ORAL at 11:09

## 2021-09-27 RX ADMIN — OXYCODONE HYDROCHLORIDE 5 MG: 5 TABLET ORAL at 22:19

## 2021-09-27 RX ADMIN — FAMOTIDINE 20 MG: 10 INJECTION, SOLUTION INTRAVENOUS at 16:35

## 2021-09-27 RX ADMIN — LORAZEPAM 1 MG: 1 TABLET ORAL at 23:16

## 2021-09-27 RX ADMIN — SODIUM CHLORIDE: 9 INJECTION, SOLUTION INTRAVENOUS at 19:03

## 2021-09-27 RX ADMIN — HYDROMORPHONE HYDROCHLORIDE 1 MG: 1 INJECTION, SOLUTION INTRAMUSCULAR; INTRAVENOUS; SUBCUTANEOUS at 12:42

## 2021-09-27 RX ADMIN — HYDROMORPHONE HYDROCHLORIDE 1 MG: 1 INJECTION, SOLUTION INTRAMUSCULAR; INTRAVENOUS; SUBCUTANEOUS at 23:54

## 2021-09-27 RX ADMIN — HYDROMORPHONE HYDROCHLORIDE 0.5 MG: 1 INJECTION, SOLUTION INTRAMUSCULAR; INTRAVENOUS; SUBCUTANEOUS at 21:32

## 2021-09-27 RX ADMIN — ONDANSETRON HYDROCHLORIDE 4 MG: 2 INJECTION, SOLUTION INTRAMUSCULAR; INTRAVENOUS at 11:25

## 2021-09-27 RX ADMIN — ACETAMINOPHEN 975 MG: 325 TABLET, FILM COATED ORAL at 18:52

## 2021-09-27 RX ADMIN — HYDROMORPHONE HYDROCHLORIDE 1 MG: 1 INJECTION, SOLUTION INTRAMUSCULAR; INTRAVENOUS; SUBCUTANEOUS at 11:07

## 2021-09-27 RX ADMIN — TAZOBACTAM SODIUM AND PIPERACILLIN SODIUM 4.5 G: 500; 4 INJECTION, SOLUTION INTRAVENOUS at 23:27

## 2021-09-27 RX ADMIN — TAZOBACTAM SODIUM AND PIPERACILLIN SODIUM 4.5 G: 500; 4 INJECTION, SOLUTION INTRAVENOUS at 12:42

## 2021-09-27 RX ADMIN — SODIUM CHLORIDE 1000 ML: 9 INJECTION, SOLUTION INTRAVENOUS at 13:16

## 2021-09-27 ASSESSMENT — ENCOUNTER SYMPTOMS
VOMITING: 0
ABDOMINAL PAIN: 1
DYSURIA: 0
WOUND: 0
HEMATURIA: 0
DIARRHEA: 0
FEVER: 0

## 2021-09-27 ASSESSMENT — ACTIVITIES OF DAILY LIVING (ADL)
ADLS_ACUITY_SCORE: 12
ADLS_ACUITY_SCORE: 3

## 2021-09-27 ASSESSMENT — MIFFLIN-ST. JEOR: SCORE: 2181.77

## 2021-09-27 NOTE — CONSULTS
"[facility]  Colon and Rectal Surgery Consult Note  Name: Pardeep Waite    MRN: [id number]  YOB: 1980    Age: 40 year old  Date of admission: 9/27/2021  Primary care provider: Annika, [facility]     Requesting Physician:  Flor Moody PA-C  Reason for consult:  Diverticulitis           History of Present Illness:   Pardeep Waite is a 40 year old male with a history of diverticulitis, psoriatic arthritis (on Enbrel), seen at the request of Flor Moody PA-C, who presents with abdominal pain.    Patient reports he developed left upper quadrant abdominal pain yesterday.  He denies any associated symptoms of nausea, vomiting, fever, chills, or diarrhea.  No blood in his stools.  He has had many episodes of diverticulitis in the past.  Due to his pain being more severe than usual and in the LUQ, this prompted him to present to the emergency department for further evaluation.  In the ED, patient febrile to 101.3F, , other vitals within normal limits.  Lab work significant for WBC 22.1.  COVID-19 positive.  Blood cultures drawn and pending.  CT abd/pelvis showed \"Acute diverticulitis involving the proximal to mid descending colon at the left flank. There is adjacent small fluid but no abscess at this time. Fatty liver. New finding of very mild groundglass opacities at the left lung base that may relate to an infectious or inflammatory cause.\"  He was admitted to the hospital for further management.    Patient reports his pain is somewhat improved since admission.  He last passed stool today.  He generally has a bowel movement twice daily.  He reports approximately 6 episodes of diverticulitis in the past and he has needed to be hospitalized for each one.      Colonoscopy History:  August 2020    Surgical History: No prior abdominal surgeries            Past Medical History:     Past Medical History:   Diagnosis Date     Arthritis              Past Surgical History: " "    Past Surgical History:   Procedure Laterality Date     COLONOSCOPY Left 8/27/2020    Procedure: COLONOSCOPY;  Surgeon: Magalie Zaragoza MD;  Location:  GI               Social History:     Social History     Tobacco Use     Smoking status: Never Smoker     Smokeless tobacco: Never Used   Substance Use Topics     Alcohol use: Yes     Comment: 2-3 times per week             Family History:     Family History   Problem Relation Age of Onset     Kidney Disease Father      Colon Cancer No family hx of              Allergies:     Allergies   Allergen Reactions     Contrast Dye Nausea and Vomiting             Medications:       famotidine  20 mg Intravenous Q12H     piperacillin-tazobactam  4.5 g Intravenous Q6H     sodium chloride (PF)  3 mL Intracatheter Q8H             Review of Systems:   A comprehensive greater than 10 system review of systems was carried out.  Pertinent positives and negatives are noted above.  Otherwise negative for contributory info.            Physical Exam:     Blood pressure 134/84, pulse 85, temperature 98.9  F (37.2  C), temperature source Temporal, resp. rate 16, height 1.854 m (6' 1\"), weight 122.8 kg (270 lb 11.2 oz), SpO2 98 %.    Intake/Output Summary (Last 24 hours) at 9/27/2021 1618  Last data filed at 9/27/2021 1200  Gross per 24 hour   Intake 1000 ml   Output --   Net 1000 ml     EXAM:  GEN: Awake alert and oriented, appears stated age  PULM: Non-labored breathing with normal respiratory effort  CVS: reg rate and rhythm, no peripheral edema  ABD: Soft, tender over LUQ, round. Guarding.  No peritoneal signs.    RECTAL: Rectal exam was deferred.  NEURO: CN II-XII grossly intact  MSK: extremeties with no clubbing, cyanosis or edema; able to ambulate.  PSYCH: responsive, alert, cooperative; oriented x3; appropriate mood and affect  EXT/SKIN: inspection reveals no rashes, lesions or ulcers, normal coloring         Data Reviewed:     Results for orders placed or performed during the " hospital encounter of 09/27/21   CT Abdomen Pelvis w Contrast    Narrative    CT ABDOMEN PELVIS WITH CONTRAST 9/27/2021 11:57 AM    CLINICAL HISTORY: Diverticulitis suspected. Left-sided abdominal pain,  history of diverticulitis.    TECHNIQUE: CT scan of the abdomen and pelvis was performed following  injection of IV contrast. Multiplanar reformats were obtained. Dose  reduction techniques were used.  CONTRAST: 100 mL Isovue-370    COMPARISON: CT abdomen and pelvis 12/21/2019.    FINDINGS:   LOWER CHEST: New mild groundglass nodular opacities at the left lung  base.    HEPATOBILIARY: Diffuse hepatic steatosis. No significant mass. No bile  duct dilatation. No calcified gallstones.    PANCREAS: Normal.    SPLEEN: Normal.    ADRENAL GLANDS: Normal.    KIDNEYS/BLADDER: No significant mass, stones, or hydronephrosis. There  are simple or benign cysts. No follow up is needed.    BOWEL: Acute diverticulitis involving the proximal to mid descending  colon at the left lateral abdomen, series 3 image 119. There is  adjacent small layering fluid posteriorly. No abscess or free air.  Normal appendix. No obstruction.    PELVIC ORGANS: Normal.    ADDITIONAL FINDINGS: None.    MUSCULOSKELETAL: No acute abnormality.      Impression    IMPRESSION:   1.  Acute diverticulitis involving the proximal to mid descending  colon at the left flank. There is adjacent small fluid but no abscess  at this time.  2.  Fatty liver.  3.  New finding of very mild groundglass opacities at the left lung  base that may relate to an infectious or inflammatory cause.    RAQUEL GARVEY MD         SYSTEM ID:  [id number]       Recent Labs   Lab 09/27/21  1054   WBC 22.1*   HGB 15.0   HCT 46.4   MCV 89        Recent Labs   Lab 09/27/21  1054      POTASSIUM 3.8   CHLORIDE 101   CO2 27   ANIONGAP 9   *   BUN 9   CR 0.91   GFRESTIMATED >90   SERENE 9.2   PROTTOTAL 8.4   ALBUMIN 4.0   BILITOTAL 1.4*   ALKPHOS 105   AST 51*   ALT 85*     No results  for input(s): INR in the last 168 hours.  Recent Labs   Lab 09/27/21  1447   COLOR Yellow   APPEARANCE Clear   URINEGLC Negative   URINEBILI Negative   URINEKETONE Negative   SG 1.005   UBLD Negative   URINEPH 7.5*   PROTEIN 50 *   NITRITE Negative   LEUKEST Negative   RBCU 1   WBCU 1         Assessment and Plan:   Pardeep Waite is a 40 year old male with a history of diverticulitis, psoriatic arthritis (on Enbrel), who presents with abdominal pain. Abdominal exam with LUQ tenderness.  Temp 100.4 F.  WBC 22.1.  COVID-19 positive. Lactic acid 1.8. CT consistent with diverticulitis.  No emergent surgery indicated. Continue with conservative medical management including bowel rest, IVF, and IV antibiotics. Briefly discussed intention to avoid urgent surgery as this would likely result in a resection with temporary stoma in setting of acute inflammation. Patient understands and agrees with plan.     Plan:  1. Admit to hospitalist.  2. Surgery: No emergent surgery indicated.  3. Diet: NPO  4. IV Fluids: Per hospitalist  5. Antibiotics:  IV Zosyn  6. Medications: Home meds per hospitalist  7. I&O s:  strict I&O s  8. Labs:   - Reviewed  - Ordered: none   9. Imaging:   - Dr. Vaughan and myself have personally viewed: CT abd/pelvis  - Ordered:  none  10. Activity: ambulate as tolerated, encourage OOB  11. DVT prophylaxis: SCD s  12. This plan has been discussed with Dr. Vaughan    Patient specific identified risk factors considered as part of today s evaluation include: BMI 35.71, recurrent diverticulitis      Additional history obtained from patient and chart.  Time spent on consultation: 30 minutes, greater than 50 percent of the total encounter time is spent in counseling and/or coordination of care.          Trupti Montague PA-C  [facility]  Phone:  [phone]

## 2021-09-27 NOTE — PHARMACY-ADMISSION MEDICATION HISTORY
Admission medication history interview status for this patient is complete. See [facility] admission navigator for allergy information, prior to admission medications and immunization status.     Medication history interview done, indicate source(s): Patient  Medication history resources (including written lists, pill bottles, clinic record):[facility], [facility]  Pharmacy: [facility], [place]    Changes made to PTA medication list:  Added: lorazepam  Changed: None  Reported as Not Taking: albuterol  Removed: None    Actions taken by pharmacist (provider contacted, etc):None     Additional medication history information:None    Medication reconciliation/reorder completed by provider prior to medication history?  N   (Y/N)       Prior to Admission medications    Medication Sig Last Dose Taking? Auth Provider   etanercept (ENBREL) 50 MG/ML injection Inject Subcutaneous once a week thursdays 9/23/2021 at Unknown time Yes Unknown, Entered By History   LORazepam (ATIVAN) 1 MG tablet Take 1 mg by mouth every 6 hours as needed for anxiety Pt take daily 9/26/2021 at Unknown time Yes Unknown, Entered By History   albuterol (PROAIR HFA/PROVENTIL HFA/VENTOLIN HFA) 108 (90 Base) MCG/ACT inhaler Inhale 2 puffs into the lungs every 4 hours as needed for shortness of breath / dyspnea or wheezing  Patient taking differently: Inhale 2 puffs into the lungs every 4 hours as needed for shortness of breath / dyspnea or wheezing Pt not taking   Myrtle Mcnally MD

## 2021-09-27 NOTE — ED PROVIDER NOTES
History     Chief Complaint:  Abdominal Pain      HPI   Pardeep Waite is a 40 year old male who presents for evaluation of left-sided abdominal pain.  Symptoms began yesterday.  This feels consistent with previous episodes of diverticulitis which she has had many of.  The pain is more severe today than normal.  It is also located slightly higher in his abdomen than normal.  He is in no bloody stools or diarrhea.  No trauma to this area.  He has not felt any fevers or chills.  No vomiting.    Review of Systems   Constitutional: Negative for fever.   Cardiovascular: Negative for chest pain.   Gastrointestinal: Positive for abdominal pain. Negative for diarrhea and vomiting.   Genitourinary: Negative for dysuria, hematuria and testicular pain.   Skin: Negative for rash and wound.   All other systems reviewed and are negative.        Allergies:  Contrast Dye      Medications:    albuterol (PROAIR HFA/PROVENTIL HFA/VENTOLIN HFA) 108 (90 Base) MCG/ACT inhaler  etanercept (ENBREL) 50 MG/ML injection        Past Medical History:    Past Medical History:   Diagnosis Date     Arthritis      Patient Active Problem List    Diagnosis Date Noted     Psoriatic arthritis (H) 12/23/2016     Priority: Medium     Using Enbrel       Diverticulitis 06/27/2014     Priority: Medium        Past Surgical History:    Past Surgical History:   Procedure Laterality Date     COLONOSCOPY Left 8/27/2020    Procedure: COLONOSCOPY;  Surgeon: Magalie Zaragoza MD;  Location:  GI       Family History:    Family History   Problem Relation Age of Onset     Kidney Disease Father      Colon Cancer No family hx of        Social History:  Presents alone.    Physical Exam     Patient Vitals for the past 24 hrs:   BP Temp Pulse Resp SpO2   09/27/21 1007 (!) 163/97 98.9  F (37.2  C) (!) 124 18 95 %       Physical Exam  Constitutional: Uncomfortable appearing.  HEENT: Atraumatic.  Moist mucous membranes.  Neck: Soft.  Supple.   Cardiac: Regular rate and  rhythm.  No murmur or rub.  Respiratory: Clear to auscultation bilaterally.  No respiratory distress.    Abdomen: Soft with left mid and upper abdominal tenderness to palpation.  No guarding.  Nondistended.  Musculoskeletal: No edema.  Normal range of motion.  Neurologic: Alert and oriented.  Normal tone and bulk. Normal gait.  Skin: No rashes.  No edema.  Psych: Normal affect.  Normal behavior.        Emergency Department Course     Imaging:  CT Abdomen Pelvis w Contrast   Final Result   IMPRESSION:    1.  Acute diverticulitis involving the proximal to mid descending   colon at the left flank. There is adjacent small fluid but no abscess   at this time.   2.  Fatty liver.   3.  New finding of very mild groundglass opacities at the left lung   base that may relate to an infectious or inflammatory cause.      RAQUEL GARVEY MD            SYSTEM ID:  [id number]          Laboratory:  Labs Ordered and Resulted from Time of ED Arrival Up to the Time of Departure from the ED   COMPREHENSIVE METABOLIC PANEL - Abnormal; Notable for the following components:       Result Value    Glucose 114 (*)     AST 51 (*)     ALT 85 (*)     Bilirubin Total 1.4 (*)     All other components within normal limits   CBC WITH PLATELETS AND DIFFERENTIAL - Abnormal; Notable for the following components:    WBC Count 22.1 (*)     Absolute Neutrophils 17.4 (*)     Absolute Monocytes 2.8 (*)     Absolute Immature Granulocytes 0.2 (*)     All other components within normal limits   LACTIC ACID WHOLE BLOOD - Normal   PERIPHERAL IV CATHETER   BLOOD CULTURE   BLOOD CULTURE   CBC WITH PLATELETS & DIFFERENTIAL    Narrative:     The following orders were created for panel order CBC with platelets differential.  Procedure                               Abnormality         Status                     ---------                               -----------         ------                     CBC with platelets and d...[[id number]]  Abnormal            Final result                  Please view results for these tests on the individual orders.         Procedures:      Emergency Department Course:    Reviewed:  I reviewed nursing notes, vitals, past history and care everywhere    Assessments:  I obtained history and examined the patient as noted above.   I rechecked the patient and explained findings.     Consults:   Hospitalists         Interventions:  1 L IV fluid normal saline  IV Dilaudid  IV morphine  IV Dilaudid  IV Zosyn  1 L IV fluids normal saline    Disposition:  The patient was admitted to the hospital under the care of Dr. Salgado.    Impression & Plan    Medical Decision Making:  Pardeep Waite is a 38-year-old man who is febrile and tachycardic.  His abdomen soft without acute peritoneal signs.  CT scan of the abdomen pelvis demonstrated acute diverticulitis without obvious complication at this time with some surrounding what appears to be stranding.  He does have a significant leukocytosis at 22,000.  He has a fever of 101.3 here.  He is tachycardic in the 120s.  He is uncomfortable and required multiple doses of pain medication.  He does meet sepsis criteria.  Was given IV Zosyn for his diverticulitis.  Discussed plan for admission and he is in agreement.  I spoke with hospitalist service who accepts him to the medical floor he is in stable condition at time of admission.    Covid-19  Pardeep Waite was evaluated during a global COVID-19 pandemic, which necessitated consideration that the patient might be at risk for infection with the SARS-CoV-2 virus that causes COVID-19.   Applicable protocols for evaluation were followed during the patient's care.   COVID-19 was considered as part of the patient's evaluation. The plan for testing is:  a test was obtained during this visit.    Diagnosis:    ICD-10-CM    1. Acute diverticulitis  K57.92    2. Sepsis, due to unspecified organism, unspecified whether acute organ dysfunction present (H)  A41.9             Rah Fragoso,  MD  09/27/21 2105

## 2021-09-27 NOTE — ED TRIAGE NOTES
Patient presents to the ED reporting LLQ abdominal pain. History of diverticulitis, states this feels similar.

## 2021-09-27 NOTE — ED NOTES
[facility]  ED Nurse Handoff Report    Pardeep Waite is a 40 year old male   ED Chief complaint: Abdominal Pain  . ED Diagnosis:   Final diagnoses:   Acute diverticulitis   Sepsis, due to unspecified organism, unspecified whether acute organ dysfunction present (H)     Allergies:   Allergies   Allergen Reactions     Contrast Dye Nausea and Vomiting       Code Status: Full Code  Activity level - Baseline/Home:  Independent. Activity Level - Current:   Stand by Assist. Lift room needed: No. Bariatric: No   Needed: No   Isolation: No. Infection: Not Applicable.     Vital Signs:   Vitals:    09/27/21 1130 09/27/21 1200 09/27/21 1230 09/27/21 1300   BP: 128/78 (!) 120/98 (!) 154/100 (!) 135/99   Pulse: 112 111 102 95   Resp:       Temp:       TempSrc:       SpO2: 90% 94% 91% 93%       Cardiac Rhythm:  ,      Pain level:    Patient confused: No. Patient Falls Risk: No.   Elimination Status: Pt has not voided while in the ED   Patient Report - Initial Complaint: abdominal pain. Focused Assessment: left sided abd pain with nausea, fever, hx diverticulitis. CT reveals acute left sided diverticulitis. Elevated WBC count.   Tests Performed: labs, CT Abnormal Results:   Labs Ordered and Resulted from Time of ED Arrival Up to the Time of Departure from the ED   COMPREHENSIVE METABOLIC PANEL - Abnormal; Notable for the following components:       Result Value    Glucose 114 (*)     AST 51 (*)     ALT 85 (*)     Bilirubin Total 1.4 (*)     All other components within normal limits   CBC WITH PLATELETS AND DIFFERENTIAL - Abnormal; Notable for the following components:    WBC Count 22.1 (*)     Absolute Neutrophils 17.4 (*)     Absolute Monocytes 2.8 (*)     Absolute Immature Granulocytes 0.2 (*)     All other components within normal limits   LACTIC ACID WHOLE BLOOD - Normal   ROUTINE UA WITH MICROSCOPIC REFLEX TO CULTURE   COVID-19 VIRUS (CORONAVIRUS) BY PCR   PERIPHERAL IV CATHETER   BLOOD CULTURE   BLOOD  CULTURE   CBC WITH PLATELETS & DIFFERENTIAL    Narrative:     The following orders were created for panel order CBC with platelets differential.  Procedure                               Abnormality         Status                     ---------                               -----------         ------                     CBC with platelets and d...[[id number]]  Abnormal            Final result                 Please view results for these tests on the individual orders.     CT Abdomen Pelvis w Contrast   Preliminary Result   IMPRESSION:    1.  Acute diverticulitis involving the proximal to mid descending   colon at the left flank. There is adjacent small fluid but no abscess   at this time.   2.  Fatty liver.   3.  New finding of very mild groundglass opacities at the left lung   base that may relate to an infectious or inflammatory cause.        Treatments provided: medications (see MAR)  Family Comments: none  OBS brochure/video discussed/provided to patient:  N/A  ED Medications:   Medications   piperacillin-tazobactam (ZOSYN) intermittent infusion 4.5 g (4.5 g Intravenous New Bag 9/27/21 1242)   0.9% sodium chloride BOLUS (has no administration in time range)   0.9% sodium chloride BOLUS (0 mLs Intravenous Stopped 9/27/21 1202)   HYDROmorphone (DILAUDID) injection 1 mg (1 mg Intravenous Given 9/27/21 1107)   ondansetron (ZOFRAN) injection 4 mg (4 mg Intravenous Given 9/27/21 1125)   acetaminophen (TYLENOL) tablet 1,000 mg (1,000 mg Oral Given 9/27/21 1109)   morphine (PF) injection 4 mg (4 mg Intravenous Given 9/27/21 1142)   iopamidol (ISOVUE-370) solution 100 mL (100 mLs Intravenous Given 9/27/21 1150)   sodium chloride 0.9 % bag 500mL for CT scan flush use (65 mLs Intravenous Given 9/27/21 1151)   HYDROmorphone (DILAUDID) injection 1 mg (1 mg Intravenous Given 9/27/21 1242)     Drips infusing:  Yes, NS bolus  For the majority of the shift, the patient's behavior Green. Interventions performed were n/a.    Sepsis  treatment initiated: No     Patient tested for COVID 19 prior to admission: YES    ED Nurse Name/Phone Number: Becky Ayoub RN,   1:09 PM    RECEIVING UNIT ED HANDOFF REVIEW    Above ED Nurse Handoff Report was reviewed: Yes  Reviewed by: Manda Henry RN on September 27, 2021 at 2:00 PM

## 2021-09-27 NOTE — H&P
[facility]    Hospitalist History and Physical    Name: Pardeep Waite    MRN: [id number]  YOB: 1980    Age: 40 year old  Date of Admission:  9/27/2021  Date of Service (when I saw the patient): 09/27/21    Assessment & Plan   Pardeep Waite is a 40 year old male with PMH significant for psoriatic arthritis on Enbrel and previous sigmoid diverticulitis who presents for left-sided upper abdominal pain related to acute diverticulitis of proximal to mid descending colon at the left flank.    ED work-up reveals: febrile up to 101.3, tachycardic, intermittently hypertensive, maintaining oxygen saturations in the mid 90s on room air, total bilirubin of 1.4, alkaline phosphatase of 105, ALT of 85, AST of 51, normal lactic acid, leukocytosis of 22.1, blood cultures drawn and pending, and CT of abdomen/pelvis shows acute diverticulitis involving the proximal to mid descending colon at the left flank, there is adjacent small fluid but no abscess at this time, fatty liver, new findings of very mild groundglass opacities at the left lung base that may be related to an infectious or inflammatory cause.     #Sepsis due to acute diverticulitis of proximal to mid descending colon: acute onset of left lateral mid abdominal pain the morning of 9/26 that acutely worsened around 19:00 that same day with associated fever up to 102, chills, and palpitations. Patient reports 6-7 episodes of diverticulitis in the past with pain typically located in his LLQ. Febrile up to 101.3, tachycardia, and intermittently hypertensive in the ED with leukocytosis of 22.1. CT scan shows acute diverticulitis involving the proximal to mid descending colon at the left flank, adjacent small fluid but no abscess at this time.   - follow blood cultures  - continue IV Zosyn   - IVF hydration with NS at 100 ml/hour  - NPO  - supportive care with antiemetics and pain control  - CRS surgery consult due to recurrent issue with  diverticulitis     #Recent COVID infection, recovered: unvaccinated, diagnosed with COVID on 8/13/21 at outside facility and was seen in the ED on 8/23 where he received a dose of IV Solumedrol and prescribed an inhaler and discharged home. CT scan on 9/27 noted very mild ground glass opacities at the left lung base that may be infectious or inflammatory, likely related to recovering COVID infection. Denies recent cough, chest pain, or shortness of breath. No episodes of hypoxia.  - no documentation of positive test in chart or care everywhere  - keep on isolations precautions for now    #Fatty liver  #Elevated LFTs: total bilirubin of 1.4, ALT of 85, and AST of 51, intermittently elevated in the past when acutely ill.  - recheck LFTs in AM    #Psoriatic arthritis: follows with Dr. Montalvo of rheumatology at [facility], hold Enbrel due to acute infection. Usually receives Enbrel injection every Thursday.     DVT Prophylaxis: Pneumatic Compression Devices and Ambulate every shift  Code Status: Full Code, discussed with patient   Disposition: Expected stay >2 midnights, will admit to inpatient     Primary Care Physician   None    Chief Complaint   Abdominal pain     History obtained from discussion with ED provider, Dr. Fragoso, chart review, and interview with patient.     History of Present Illness   Pardeep Waite is a 40 year old male who presents with with acute onset the morning of 9/26 of left lateral mid abdominal pain without radiation into back or lower abdomen. He reports associated fever up to 102, chills, and palpitations. Denies associated nausea, vomiting, headache, lightheadedness, dizziness, or urinary symptoms. His last BM was this morning and reportedly looser than normal. He states his pain became acutely severe around 19:00 last night causing him insomnia throughout the night. Compressing the area would help his pain. He did not try any pain mediation at home. He states his pain is in a different  location than his typical episodes of diverticulitis (usually left lower quadrant). He has not previously required surgery for his diverticulitis but has seen colorectal surgery in the past. He has had a colonoscopy within the last year without any concerns. He takes metamucil relatively regularly. He states he ate a cheeseburger late on Saturday night and went to bed shortly after. He questions if this contributed to the onset of his symptoms.     Past Medical History    Past Medical History:   Diagnosis Date     Arthritis      Past Surgical History   Past Surgical History:   Procedure Laterality Date     COLONOSCOPY Left 8/27/2020    Procedure: COLONOSCOPY;  Surgeon: Magalie Zaragoza MD;  Location:  GI       Prior to Admission Medications   Prior to Admission Medications   Prescriptions Last Dose Informant Patient Reported? Taking?   LORazepam (ATIVAN) 1 MG tablet 9/26/2021 at Unknown time  Yes Yes   Sig: Take 1 mg by mouth every 6 hours as needed for anxiety Pt take daily   albuterol (PROAIR HFA/PROVENTIL HFA/VENTOLIN HFA) 108 (90 Base) MCG/ACT inhaler   No No   Sig: Inhale 2 puffs into the lungs every 4 hours as needed for shortness of breath / dyspnea or wheezing   Patient taking differently: Inhale 2 puffs into the lungs every 4 hours as needed for shortness of breath / dyspnea or wheezing Pt not taking   etanercept (ENBREL) 50 MG/ML injection 9/23/2021 at Unknown time  Yes Yes   Sig: Inject Subcutaneous once a week thursdays      Facility-Administered Medications: None     Allergies   Allergies   Allergen Reactions     Contrast Dye Nausea and Vomiting       Social History   Social History     Tobacco Use     Smoking status: Never Smoker     Smokeless tobacco: Never Used   Substance Use Topics     Alcohol use: Yes     Comment: 2-3 times per week     Social History     Social History Narrative     Not on file       Family History   Family history reviewed with patient and is noncontributory.    Review of  Systems   A Comprehensive greater than 10 system review of systems was carried out.  Pertinent positives and negatives are noted above.  Otherwise negative for contributory information.    Physical Exam   Temp: (!) 101.3  F (38.5  C) Temp src: Oral BP: (!) 135/99 Pulse: 95   Resp: 18 SpO2: 93 % O2 Device: None (Room air)    Vital Signs with Ranges  Temp:  [98.9  F (37.2  C)-101.3  F (38.5  C)] 101.3  F (38.5  C)  Pulse:  [] 95  Resp:  [18] 18  BP: (120-163)/() 135/99  SpO2:  [90 %-98 %] 93 %  0 lbs 0 oz    GEN:  Alert, oriented x 3, appears intermittently uncomfortable, no overt distress  HEENT:  Normocephalic/atraumatic, no scleral icterus, no nasal discharge, mouth moist.  CV:  Regular rate and rhythm, no murmur or JVD.  S1 + S2 noted, no S3 or S4.  LUNGS:  Clear to auscultation bilaterally without rales/rhonchi/wheezing/retractions.  Symmetric chest rise on inhalation noted.  ABD:  Active bowel sounds, soft, tender in left mid abdomen and LUQ with palpation, non-distended.  No rebound/guarding/rigidity.  EXT:  No edema.  No cyanosis.  No acute joint synovitis noted.  SKIN:  Dry to touch, no exanthems noted in the visualized areas.  NEURO:  Symmetric muscle strength, sensation to touch grossly intact.  Coordination symmetric on general exam.  No new focal deficits appreciated.    Data   Data reviewed today:  I personally reviewed all labs and imaging from this visit.  Results for orders placed or performed during the hospital encounter of 09/27/21   CT Abdomen Pelvis w Contrast     Status: None    Narrative    CT ABDOMEN PELVIS WITH CONTRAST 9/27/2021 11:57 AM    CLINICAL HISTORY: Diverticulitis suspected. Left-sided abdominal pain,  history of diverticulitis.    TECHNIQUE: CT scan of the abdomen and pelvis was performed following  injection of IV contrast. Multiplanar reformats were obtained. Dose  reduction techniques were used.  CONTRAST: 100 mL Isovue-370    COMPARISON: CT abdomen and pelvis  12/21/2019.    FINDINGS:   LOWER CHEST: New mild groundglass nodular opacities at the left lung  base.    HEPATOBILIARY: Diffuse hepatic steatosis. No significant mass. No bile  duct dilatation. No calcified gallstones.    PANCREAS: Normal.    SPLEEN: Normal.    ADRENAL GLANDS: Normal.    KIDNEYS/BLADDER: No significant mass, stones, or hydronephrosis. There  are simple or benign cysts. No follow up is needed.    BOWEL: Acute diverticulitis involving the proximal to mid descending  colon at the left lateral abdomen, series 3 image 119. There is  adjacent small layering fluid posteriorly. No abscess or free air.  Normal appendix. No obstruction.    PELVIC ORGANS: Normal.    ADDITIONAL FINDINGS: None.    MUSCULOSKELETAL: No acute abnormality.      Impression    IMPRESSION:   1.  Acute diverticulitis involving the proximal to mid descending  colon at the left flank. There is adjacent small fluid but no abscess  at this time.  2.  Fatty liver.  3.  New finding of very mild groundglass opacities at the left lung  base that may relate to an infectious or inflammatory cause.    RAQUEL GARVEY MD         SYSTEM ID:  [id number]   Comprehensive metabolic panel     Status: Abnormal   Result Value Ref Range    Sodium 137 133 - 144 mmol/L    Potassium 3.8 3.4 - 5.3 mmol/L    Chloride 101 94 - 109 mmol/L    Carbon Dioxide (CO2) 27 20 - 32 mmol/L    Anion Gap 9 3 - 14 mmol/L    Urea Nitrogen 9 7 - 30 mg/dL    Creatinine 0.91 0.66 - 1.25 mg/dL    Calcium 9.2 8.5 - 10.1 mg/dL    Glucose 114 (H) 70 - 99 mg/dL    Alkaline Phosphatase 105 40 - 150 U/L    AST 51 (H) 0 - 45 U/L    ALT 85 (H) 0 - 70 U/L    Protein Total 8.4 6.8 - 8.8 g/dL    Albumin 4.0 3.4 - 5.0 g/dL    Bilirubin Total 1.4 (H) 0.2 - 1.3 mg/dL    GFR Estimate >90 >60 mL/min/1.73m2   Lactic acid whole blood     Status: Normal   Result Value Ref Range    Lactic Acid 1.8 0.7 - 2.0 mmol/L   UA with Microscopic reflex to Culture     Status: Abnormal    Specimen: Urine, Clean Catch    Result Value Ref Range    Color Urine Yellow Colorless, Straw, Light Yellow, Yellow    Appearance Urine Clear Clear    Glucose Urine Negative Negative mg/dL    Bilirubin Urine Negative Negative    Ketones Urine Negative Negative mg/dL    Specific Gravity Urine 1.005 1.003 - 1.035    Blood Urine Negative Negative    pH Urine 7.5 (H) 5.0 - 7.0    Protein Albumin Urine 50  (A) Negative mg/dL    Urobilinogen Urine Normal Normal, 2.0 mg/dL    Nitrite Urine Negative Negative    Leukocyte Esterase Urine Negative Negative    Mucus Urine Present (A) None Seen /LPF    RBC Urine 1 <=2 /HPF    WBC Urine 1 <=5 /HPF    Squamous Epithelials Urine <1 <=1 /HPF    Narrative    Urine Culture not indicated   CBC with platelets and differential     Status: Abnormal   Result Value Ref Range    WBC Count 22.1 (H) 4.0 - 11.0 10e3/uL    RBC Count 5.24 4.40 - 5.90 10e6/uL    Hemoglobin 15.0 13.3 - 17.7 g/dL    Hematocrit 46.4 40.0 - 53.0 %    MCV 89 78 - 100 fL    MCH 28.6 26.5 - 33.0 pg    MCHC 32.3 31.5 - 36.5 g/dL    RDW 13.3 10.0 - 15.0 %    Platelet Count 282 150 - 450 10e3/uL    % Neutrophils 78 %    % Lymphocytes 8 %    % Monocytes 13 %    % Eosinophils 0 %    % Basophils 0 %    % Immature Granulocytes 1 %    NRBCs per 100 WBC 0 <1 /100    Absolute Neutrophils 17.4 (H) 1.6 - 8.3 10e3/uL    Absolute Lymphocytes 1.7 0.8 - 5.3 10e3/uL    Absolute Monocytes 2.8 (H) 0.0 - 1.3 10e3/uL    Absolute Eosinophils 0.0 0.0 - 0.7 10e3/uL    Absolute Basophils 0.1 0.0 - 0.2 10e3/uL    Absolute Immature Granulocytes 0.2 (H) <=0.0 10e3/uL    Absolute NRBCs 0.0 10e3/uL   Asymptomatic COVID-19 Virus (Coronavirus) by PCR Nasopharyngeal     Status: Abnormal    Specimen: Nasopharyngeal; Swab   Result Value Ref Range    SARS CoV2 PCR Positive (A) Negative    Narrative    Testing was performed using the my  SARS-CoV-2 & Influenza A/B Assay on the my  [employer]  System.  This test should be ordered for the detection of SARS-COV-2 in individuals who meet  SARS-CoV-2 clinical and/or epidemiological criteria. Test performance is unknown in asymptomatic patients.  This test is for in vitro diagnostic use under the FDA EUA for laboratories certified under CLIA to perform moderate and/or high complexity testing. This test has not been FDA cleared or approved.  A negative test does not rule out the presence of PCR inhibitors in the specimen or target RNA in concentration below the limit of detection for the assay. The possibility of a false negative should be considered if the patient's recent exposure or clinical presentation suggests COVID-19.  [facility] are certified under the Clinical Laboratory Improvement Amendments of 1988 (CLIA-88) as qualified to perform moderate and/or high complexity laboratory testing.   CBC with platelets differential     Status: Abnormal    Narrative    The following orders were created for panel order CBC with platelets differential.  Procedure                               Abnormality         Status                     ---------                               -----------         ------                     CBC with platelets and d...[[id number]]  Abnormal            Final result                 Please view results for these tests on the individual orders.     Becky MARIA I discussed the patient with Dr. Lassiter and he agrees with the above plan.

## 2021-09-28 LAB
ALBUMIN SERPL-MCNC: 3.1 G/DL (ref 3.4–5)
ALP SERPL-CCNC: 84 U/L (ref 40–150)
ALT SERPL W P-5'-P-CCNC: 81 U/L (ref 0–70)
ANION GAP SERPL CALCULATED.3IONS-SCNC: 4 MMOL/L (ref 3–14)
AST SERPL W P-5'-P-CCNC: 45 U/L (ref 0–45)
BASOPHILS # BLD AUTO: 0 10E3/UL (ref 0–0.2)
BASOPHILS NFR BLD AUTO: 0 %
BILIRUB SERPL-MCNC: 2.3 MG/DL (ref 0.2–1.3)
BUN SERPL-MCNC: 12 MG/DL (ref 7–30)
CALCIUM SERPL-MCNC: 8.6 MG/DL (ref 8.5–10.1)
CHLORIDE BLD-SCNC: 102 MMOL/L (ref 94–109)
CO2 SERPL-SCNC: 29 MMOL/L (ref 20–32)
CREAT SERPL-MCNC: 0.97 MG/DL (ref 0.66–1.25)
EOSINOPHIL # BLD AUTO: 0.1 10E3/UL (ref 0–0.7)
EOSINOPHIL NFR BLD AUTO: 1 %
ERYTHROCYTE [DISTWIDTH] IN BLOOD BY AUTOMATED COUNT: 13.2 % (ref 10–15)
GFR SERPL CREATININE-BSD FRML MDRD: >90 ML/MIN/1.73M2
GLUCOSE BLD-MCNC: 106 MG/DL (ref 70–99)
HCT VFR BLD AUTO: 37.5 % (ref 40–53)
HGB BLD-MCNC: 12 G/DL (ref 13.3–17.7)
HOLD SPECIMEN: NORMAL
IMM GRANULOCYTES # BLD: 0.1 10E3/UL
IMM GRANULOCYTES NFR BLD: 1 %
LYMPHOCYTES # BLD AUTO: 1.6 10E3/UL (ref 0.8–5.3)
LYMPHOCYTES NFR BLD AUTO: 10 %
MCH RBC QN AUTO: 28.1 PG (ref 26.5–33)
MCHC RBC AUTO-ENTMCNC: 32 G/DL (ref 31.5–36.5)
MCV RBC AUTO: 88 FL (ref 78–100)
MONOCYTES # BLD AUTO: 1.6 10E3/UL (ref 0–1.3)
MONOCYTES NFR BLD AUTO: 10 %
NEUTROPHILS # BLD AUTO: 13.1 10E3/UL (ref 1.6–8.3)
NEUTROPHILS NFR BLD AUTO: 78 %
NRBC # BLD AUTO: 0 10E3/UL
NRBC BLD AUTO-RTO: 0 /100
PLATELET # BLD AUTO: 194 10E3/UL (ref 150–450)
POTASSIUM BLD-SCNC: 3.4 MMOL/L (ref 3.4–5.3)
PROT SERPL-MCNC: 6.9 G/DL (ref 6.8–8.8)
RBC # BLD AUTO: 4.27 10E6/UL (ref 4.4–5.9)
SODIUM SERPL-SCNC: 135 MMOL/L (ref 133–144)
WBC # BLD AUTO: 16.4 10E3/UL (ref 4–11)

## 2021-09-28 PROCEDURE — 250N000011 HC RX IP 250 OP 636: Performed by: HOSPITALIST

## 2021-09-28 PROCEDURE — 250N000011 HC RX IP 250 OP 636: Performed by: PHYSICIAN ASSISTANT

## 2021-09-28 PROCEDURE — 250N000013 HC RX MED GY IP 250 OP 250 PS 637: Performed by: HOSPITALIST

## 2021-09-28 PROCEDURE — 258N000003 HC RX IP 258 OP 636: Performed by: PHYSICIAN ASSISTANT

## 2021-09-28 PROCEDURE — 36415 COLL VENOUS BLD VENIPUNCTURE: CPT | Performed by: PHYSICIAN ASSISTANT

## 2021-09-28 PROCEDURE — 80053 COMPREHEN METABOLIC PANEL: CPT | Performed by: PHYSICIAN ASSISTANT

## 2021-09-28 PROCEDURE — 250N000013 HC RX MED GY IP 250 OP 250 PS 637: Performed by: PHYSICIAN ASSISTANT

## 2021-09-28 PROCEDURE — 120N000001 HC R&B MED SURG/OB

## 2021-09-28 PROCEDURE — 85025 COMPLETE CBC W/AUTO DIFF WBC: CPT | Performed by: PHYSICIAN ASSISTANT

## 2021-09-28 PROCEDURE — 250N000009 HC RX 250: Performed by: PHYSICIAN ASSISTANT

## 2021-09-28 PROCEDURE — 99233 SBSQ HOSP IP/OBS HIGH 50: CPT | Performed by: INTERNAL MEDICINE

## 2021-09-28 RX ADMIN — HYDROMORPHONE HYDROCHLORIDE 0.5 MG: 1 INJECTION, SOLUTION INTRAMUSCULAR; INTRAVENOUS; SUBCUTANEOUS at 11:27

## 2021-09-28 RX ADMIN — HYDROMORPHONE HYDROCHLORIDE 0.5 MG: 1 INJECTION, SOLUTION INTRAMUSCULAR; INTRAVENOUS; SUBCUTANEOUS at 16:39

## 2021-09-28 RX ADMIN — TAZOBACTAM SODIUM AND PIPERACILLIN SODIUM 4.5 G: 500; 4 INJECTION, SOLUTION INTRAVENOUS at 23:18

## 2021-09-28 RX ADMIN — TAZOBACTAM SODIUM AND PIPERACILLIN SODIUM 4.5 G: 500; 4 INJECTION, SOLUTION INTRAVENOUS at 05:19

## 2021-09-28 RX ADMIN — OXYCODONE HYDROCHLORIDE 10 MG: 5 TABLET ORAL at 14:22

## 2021-09-28 RX ADMIN — TAZOBACTAM SODIUM AND PIPERACILLIN SODIUM 4.5 G: 500; 4 INJECTION, SOLUTION INTRAVENOUS at 16:57

## 2021-09-28 RX ADMIN — HYDROMORPHONE HYDROCHLORIDE 0.5 MG: 1 INJECTION, SOLUTION INTRAMUSCULAR; INTRAVENOUS; SUBCUTANEOUS at 09:17

## 2021-09-28 RX ADMIN — OXYCODONE HYDROCHLORIDE 10 MG: 5 TABLET ORAL at 02:59

## 2021-09-28 RX ADMIN — TAZOBACTAM SODIUM AND PIPERACILLIN SODIUM 4.5 G: 500; 4 INJECTION, SOLUTION INTRAVENOUS at 11:27

## 2021-09-28 RX ADMIN — LORAZEPAM 1 MG: 1 TABLET ORAL at 20:46

## 2021-09-28 RX ADMIN — OXYCODONE HYDROCHLORIDE 10 MG: 5 TABLET ORAL at 19:19

## 2021-09-28 RX ADMIN — SODIUM CHLORIDE: 9 INJECTION, SOLUTION INTRAVENOUS at 05:32

## 2021-09-28 RX ADMIN — FAMOTIDINE 20 MG: 10 INJECTION, SOLUTION INTRAVENOUS at 14:23

## 2021-09-28 RX ADMIN — ACETAMINOPHEN 975 MG: 325 TABLET, FILM COATED ORAL at 05:19

## 2021-09-28 RX ADMIN — FAMOTIDINE 20 MG: 10 INJECTION, SOLUTION INTRAVENOUS at 02:58

## 2021-09-28 RX ADMIN — SODIUM CHLORIDE: 9 INJECTION, SOLUTION INTRAVENOUS at 16:39

## 2021-09-28 RX ADMIN — LORAZEPAM 1 MG: 1 TABLET ORAL at 05:19

## 2021-09-28 RX ADMIN — HYDROMORPHONE HYDROCHLORIDE 0.5 MG: 1 INJECTION, SOLUTION INTRAMUSCULAR; INTRAVENOUS; SUBCUTANEOUS at 06:52

## 2021-09-28 RX ADMIN — HYDROMORPHONE HYDROCHLORIDE 0.5 MG: 1 INJECTION, SOLUTION INTRAMUSCULAR; INTRAVENOUS; SUBCUTANEOUS at 21:44

## 2021-09-28 ASSESSMENT — ACTIVITIES OF DAILY LIVING (ADL)
ADLS_ACUITY_SCORE: 3
ADLS_ACUITY_SCORE: 5

## 2021-09-28 NOTE — PLAN OF CARE
[phone] RN    A&O.  VSS on RA, except temp 100.3, declined tylenol.  Pain control issues, herman BARON.  Increased both oxycodone and IV dilaudid dosing.  CMS intact.  SBA.  Voiding in good amounts.  NPO with ice chips.  Will continue to monitor.

## 2021-09-28 NOTE — PLAN OF CARE
Evening RN    Patient vital signs are at baseline: No,  Reason:  Running temps intermittently, helped by PO Tylenol.  Patient able to ambulate as they were prior to admission or with assist devices provided by therapies during their stay:  Yes  Patient MUST void prior to discharge:  Yes  Patient able to tolerate oral intake:  No,  Reason:  Strict NPO with ice chips and meds.  Pain has adequate pain control using Oral analgesics:  No,  Reason:  Utilizing both PO oxycodone and IV dilaudid for pain management.    Pt A/O x4.  VSS with intermittent temps up to 103.  Pain managed adequately with PRN PO oxycodone, IV dilaudid - pt was saying it was adequate and he was able to sleep w/o pain for awhile, but late in evening complaining that pain meds are not doing enough for him, advised will let oxycodone kick in and then reach out to MD if feels pain still too high.  CMS intact.  Skin wdl.  Up SBA.  Voiding adequately.  Tolerating NPO with ice chips, no nausea.  Abd soft/rounded and tender to palpation, bowel sounds hypoactive.  Special precautions maintained.  Zosyn as antibiotic.  Will continue to monitor.

## 2021-09-28 NOTE — PROGRESS NOTES
COLON & RECTAL SURGERY  PROGRESS NOTE    September 28, 2021    SUBJECTIVE:  Patient reports night sweats and uncontrolled abdominal pain overnight.  IV dilaudid and oxycodone doses increased.  Patient now with better pain control.  No nausea or vomiting.  He has been up walking around his room off and on.  No more BMs since yesterday.      OBJECTIVE:  Temp:  [98.9  F (37.2  C)-103.4  F (39.7  C)] 101.3  F (38.5  C)  Pulse:  [] 92  Resp:  [16-20] 20  BP: (120-163)/() 134/77  SpO2:  [90 %-98 %] 92 %    Intake/Output Summary (Last 24 hours) at 9/28/2021 0838  Last data filed at 9/28/2021 0654  Gross per 24 hour   Intake 2753 ml   Output 600 ml   Net 2153 ml       GENERAL:  Awake, alert, no acute distress, lying in bed.  HEAD: Nomocephalic atraumatic  SCLERA: anicteric  EXTREMITIES: warm and well perfused  ABDOMEN:  Soft, round, tender over left upper quadrant, no rebound or guarding, no peritoneal signs.    LABS:  Lab Results   Component Value Date    WBC 16.4 09/28/2021    WBC 14.5 07/06/2020     Lab Results   Component Value Date    HGB 12.0 09/28/2021    HGB 16.2 07/06/2020     Lab Results   Component Value Date    HCT 37.5 09/28/2021    HCT 49.1 07/06/2020     Lab Results   Component Value Date     09/28/2021     07/06/2020     Last Basic Metabolic Panel:  Lab Results   Component Value Date     09/28/2021     07/06/2020      Lab Results   Component Value Date    POTASSIUM 3.4 09/28/2021    POTASSIUM 4.0 07/06/2020     Lab Results   Component Value Date    CHLORIDE 102 09/28/2021    CHLORIDE 102 07/06/2020     Lab Results   Component Value Date    SERENE 8.6 09/28/2021    SERENE 8.9 07/06/2020     Lab Results   Component Value Date    CO2 29 09/28/2021    CO2 27 07/06/2020     Lab Results   Component Value Date    BUN 12 09/28/2021    BUN 11 07/06/2020     Lab Results   Component Value Date    CR 0.97 09/28/2021    CR 0.83 07/06/2020     Lab Results   Component Value Date      09/28/2021     07/06/2020       ASSESSMENT/PLAN: 40 year old man admitted with recurrent diverticulitis. COVID-19 positive.  Intermittent fevers.  WBC improved to 16.4 from 22.1.    - NPO  - IV fluids  - IV Zosyn  - Pain management as needed  - Strict I&Os  - Encourage ambulation as able  - Serial abdominal exams  - Please call us if patient clinically declining      For questions/paging, please contact the CRS office at [phone].    Trupti Montague PA-C  [facility]  Phone: [phone]    Colon and Rectal [facility] Attending Note    Patient seen and examined independently.  Agree with above assessment and plan.  Feeling better but on narcoitics frequently. Fever and WBC count better. + flatus no BM  abd soft, with focal left flank pain.  Plan as above.   NPO. IV abx, IV fluids.    Arabella Vaughan MD  [facility]  [address], [address]  [place] [address]  T: [phone]  F: [phone]   [email or web address]

## 2021-09-28 NOTE — PROGRESS NOTES
[facility]    Hospitalist Progress Note  Name: Pardeep Waite    MRN: [id number]  Provider: Aura Galarza MD  Date of Service: 09/28/2021    Assessment & Plan   Summary of Stay: Pardeep Waite is a 40 year old male who was admitted on 9/27/2021 for sepsis due to acute diverticulitis.  Patient's past medical history significant for recent COVID-19 infection diagnosed 8/13/2021 treated with IV steroids and discharged home, psoriatic  arthritis on Enbrel, previous history of sigmoid diverticulitis presented with left upper abdominal pain.    In the emergency room patient was febrile tachycardic hypertensive elevated LFTs leukocytosis.  CT abdomen pelvis showed acute diverticulitis and groundglass appearance of both lower lung bases.    #Sepsis due to acute diverticulitis of proximal to mid descending colon:  -On presentation febrile tachycardic leukocytosis abnormal CT findings  - follow blood cultures  - continue IV Zosyn   - IVF hydration with NS at 100 ml/hour  - NPO  - supportive care with antiemetics and pain control  - CRS surgery consult due to recurrent issue with diverticulitis   -WBC count trending down       #Recent COVID infection, recovered: unvaccinated, diagnosed with COVID on 8/13/21 at outside facility and was seen in the ED on 8/23 where he received a dose of IV Solumedrol and prescribed an inhaler and discharged home. CT scan on 9/27 noted very mild ground glass opacities at the left lung base that may be infectious or inflammatory, likely related to recovering COVID infection. Denies recent cough, chest pain, or shortness of breath. No episodes of hypoxia.  - no documentation of positive test in chart or care everywhere  - keep on isolations precautions for now     #Fatty liver  #Elevated LFTs: total bilirubin of 1.4, ALT of 85, and AST of 51, intermittently elevated in the past when acutely ill.  - will fiollow LFTs     #Psoriatic arthritis: follows with Dr. Montalvo of rheumatology at  [facility],  - hold Enbrel due to acute infection. Usually receives Enbrel injection every Thursday.     #Drop in hemoglobin  -Likely due to fluid  -We will recheck in a.m.    DVT Prophylaxis: Pneumatic Compression Devices  Code Status: Full Code    Disposition: Expected discharge to be decided.      Interval History   Assumed care reviewed chart.  Patient complains of abdominal pain better with pain meds but gets worse when he moves in certain directions.  Denies any chest pain or shortness of breath.  No nausea or vomiting.  Review of all other symptoms are negative.  More than 10 point review of systems was carried out.    -Data reviewed today: I reviewed all new labs and imaging reports over the last 24 hours. I personally reviewed no images or EKG's today.    Physical Exam   Temp: 98.6  F (37  C) Temp src: Temporal BP: 134/77 Pulse: 86   Resp: 16 SpO2: 92 % O2 Device: None (Room air)    Vitals:    09/27/21 1434   Weight: 122.8 kg (270 lb 11.2 oz)     Vital Signs with Ranges  Temp:  [98.6  F (37  C)-103.4  F (39.7  C)] 98.6  F (37  C)  Pulse:  [] 86  Resp:  [16-20] 16  BP: (120-163)/() 134/77  SpO2:  [90 %-98 %] 92 %  I/O last 3 completed shifts:  In: 2753 [I.V.:1753; IV Piggyback:1000]  Out: 600 [Urine:600]      GEN:  Alert, oriented x 3, appears comfortable, NAD.  HEENT:  Normocephalic/atraumatic, no scleral icterus, no nasal discharge, mouth moist.  CV:  Regular rate and rhythm, no murmur or JVD.  S1 + S2 noted, no S3 or S4.  LUNGS:  Clear to auscultation bilaterally without rales/rhonchi/wheezing/retractions.  Symmetric chest rise on inhalation noted.  ABD: Decreased bowel sounds, distended, left lower quadrant tenderness.  EXT:  No edema.  No cyanosis.  No joint synovitis noted.  SKIN:  Dry to touch, no exanthems noted in the visualized areas.    Medications     sodium chloride 100 mL/hr at 09/28/21 0654       famotidine  20 mg Intravenous Q12H     piperacillin-tazobactam  4.5 g Intravenous  Q6H     sodium chloride (PF)  3 mL Intracatheter Q8H     Data     No results for input(s): PH, PHV, PO2, PO2V, SAT, PCO2, PCO2V, HCO3, HCO3V in the last 168 hours.  Recent Labs   Lab 09/28/21  0644 09/27/21  1054   WBC 16.4* 22.1*   HGB 12.0* 15.0   HCT 37.5* 46.4   MCV 88 89    282     Recent Labs   Lab 09/28/21  0644 09/27/21  1054    137   POTASSIUM 3.4 3.8   CHLORIDE 102 101   CO2 29 27   ANIONGAP 4 9   * 114*   BUN 12 9   CR 0.97 0.91   GFRESTIMATED >90 >90   SERENE 8.6 9.2     No results for input(s): CULT in the last 168 hours.  No results for input(s): NTBNPI, NTBNP in the last 168 hours.  No results for input(s): CKT in the last 168 hours.    Invalid input(s): CK, CK TOTAL  Recent Labs   Lab 09/28/21  0644 09/27/21  1054   CR 0.97 0.91     No results for input(s): DD in the last 168 hours.  No results for input(s): SED, CRP in the last 168 hours.  Recent Labs   Lab 09/28/21  0644 09/27/21  1054   * 114*     Recent Labs   Lab 09/28/21  0644 09/27/21  1054   HGB 12.0* 15.0     Recent Labs   Lab 09/28/21  0644 09/27/21  1054   AST 45 51*   ALT 81* 85*   ALKPHOS 84 105   BILITOTAL 2.3* 1.4*     No results for input(s): INR in the last 168 hours.  Recent Labs   Lab 09/27/21  1054   LACT 1.8     No results for input(s): LIPASE in the last 168 hours.  Recent Labs   Lab 09/28/21  0644 09/27/21  1054   BUN 12 9   CR 0.97 0.91     No results for input(s): TSH in the last 168 hours.  No results for input(s): TROPONIN, TROPI, TROPR in the last 168 hours.    Invalid input(s): TROP, TROPONINIES  Recent Labs   Lab 09/27/21  1447   COLOR Yellow   APPEARANCE Clear   URINEGLC Negative   URINEBILI Negative   URINEKETONE Negative   SG 1.005   UBLD Negative   URINEPH 7.5*   PROTEIN 50 *   NITRITE Negative   LEUKEST Negative   RBCU 1   WBCU 1       Recent Results (from the past 24 hour(s))   CT Abdomen Pelvis w Contrast    Narrative    CT ABDOMEN PELVIS WITH CONTRAST 9/27/2021 11:57 AM    CLINICAL  HISTORY: Diverticulitis suspected. Left-sided abdominal pain,  history of diverticulitis.    TECHNIQUE: CT scan of the abdomen and pelvis was performed following  injection of IV contrast. Multiplanar reformats were obtained. Dose  reduction techniques were used.  CONTRAST: 100 mL Isovue-370    COMPARISON: CT abdomen and pelvis 12/21/2019.    FINDINGS:   LOWER CHEST: New mild groundglass nodular opacities at the left lung  base.    HEPATOBILIARY: Diffuse hepatic steatosis. No significant mass. No bile  duct dilatation. No calcified gallstones.    PANCREAS: Normal.    SPLEEN: Normal.    ADRENAL GLANDS: Normal.    KIDNEYS/BLADDER: No significant mass, stones, or hydronephrosis. There  are simple or benign cysts. No follow up is needed.    BOWEL: Acute diverticulitis involving the proximal to mid descending  colon at the left lateral abdomen, series 3 image 119. There is  adjacent small layering fluid posteriorly. No abscess or free air.  Normal appendix. No obstruction.    PELVIC ORGANS: Normal.    ADDITIONAL FINDINGS: None.    MUSCULOSKELETAL: No acute abnormality.      Impression    IMPRESSION:   1.  Acute diverticulitis involving the proximal to mid descending  colon at the left flank. There is adjacent small fluid but no abscess  at this time.  2.  Fatty liver.  3.  New finding of very mild groundglass opacities at the left lung  base that may relate to an infectious or inflammatory cause.    RAQUEL GARVEY MD         SYSTEM ID:  [id number]

## 2021-09-28 NOTE — PROVIDER NOTIFICATION
Web based page:    Pain control issues.  Has 0.5 IV dilaudid,  5 oxycodone and 1mg ativan on board- still c/o 9/10 pain.  He is demanding something stronger for pain.  thank you

## 2021-09-28 NOTE — PLAN OF CARE
Up independently in room.  Abdomen round and distended.  BS very faint.  Denies flatus.  Voiding in urinal dark love urine. Temp max. 99.  Had two doses of IV Dilaudid and one dose of PO Oxycodone this shift.  IVF infusing.  Taking ice chips.

## 2021-09-29 LAB
ANION GAP SERPL CALCULATED.3IONS-SCNC: 8 MMOL/L (ref 3–14)
BUN SERPL-MCNC: 9 MG/DL (ref 7–30)
CALCIUM SERPL-MCNC: 8.5 MG/DL (ref 8.5–10.1)
CHLORIDE BLD-SCNC: 102 MMOL/L (ref 94–109)
CO2 SERPL-SCNC: 25 MMOL/L (ref 20–32)
CREAT SERPL-MCNC: 0.89 MG/DL (ref 0.66–1.25)
ERYTHROCYTE [DISTWIDTH] IN BLOOD BY AUTOMATED COUNT: 12.7 % (ref 10–15)
GFR SERPL CREATININE-BSD FRML MDRD: >90 ML/MIN/1.73M2
GLUCOSE BLD-MCNC: 92 MG/DL (ref 70–99)
HCT VFR BLD AUTO: 36.4 % (ref 40–53)
HGB BLD-MCNC: 11.6 G/DL (ref 13.3–17.7)
MCH RBC QN AUTO: 28 PG (ref 26.5–33)
MCHC RBC AUTO-ENTMCNC: 31.9 G/DL (ref 31.5–36.5)
MCV RBC AUTO: 88 FL (ref 78–100)
PLATELET # BLD AUTO: 182 10E3/UL (ref 150–450)
POTASSIUM BLD-SCNC: 3.4 MMOL/L (ref 3.4–5.3)
RBC # BLD AUTO: 4.14 10E6/UL (ref 4.4–5.9)
SODIUM SERPL-SCNC: 135 MMOL/L (ref 133–144)
WBC # BLD AUTO: 10.2 10E3/UL (ref 4–11)

## 2021-09-29 PROCEDURE — 250N000009 HC RX 250: Performed by: PHYSICIAN ASSISTANT

## 2021-09-29 PROCEDURE — 250N000011 HC RX IP 250 OP 636: Performed by: PHYSICIAN ASSISTANT

## 2021-09-29 PROCEDURE — 99233 SBSQ HOSP IP/OBS HIGH 50: CPT | Performed by: INTERNAL MEDICINE

## 2021-09-29 PROCEDURE — 80048 BASIC METABOLIC PNL TOTAL CA: CPT | Performed by: INTERNAL MEDICINE

## 2021-09-29 PROCEDURE — 250N000013 HC RX MED GY IP 250 OP 250 PS 637: Performed by: HOSPITALIST

## 2021-09-29 PROCEDURE — 85027 COMPLETE CBC AUTOMATED: CPT | Performed by: INTERNAL MEDICINE

## 2021-09-29 PROCEDURE — 258N000003 HC RX IP 258 OP 636: Performed by: PHYSICIAN ASSISTANT

## 2021-09-29 PROCEDURE — 120N000001 HC R&B MED SURG/OB

## 2021-09-29 PROCEDURE — 36415 COLL VENOUS BLD VENIPUNCTURE: CPT | Performed by: INTERNAL MEDICINE

## 2021-09-29 PROCEDURE — 250N000013 HC RX MED GY IP 250 OP 250 PS 637: Performed by: PHYSICIAN ASSISTANT

## 2021-09-29 PROCEDURE — 250N000013 HC RX MED GY IP 250 OP 250 PS 637: Performed by: INTERNAL MEDICINE

## 2021-09-29 RX ORDER — CARBOXYMETHYLCELLULOSE SODIUM 5 MG/ML
1 SOLUTION/ DROPS OPHTHALMIC
Status: DISCONTINUED | OUTPATIENT
Start: 2021-09-29 | End: 2021-09-30 | Stop reason: HOSPADM

## 2021-09-29 RX ADMIN — SODIUM CHLORIDE: 9 INJECTION, SOLUTION INTRAVENOUS at 17:14

## 2021-09-29 RX ADMIN — OXYCODONE HYDROCHLORIDE 10 MG: 5 TABLET ORAL at 09:51

## 2021-09-29 RX ADMIN — TAZOBACTAM SODIUM AND PIPERACILLIN SODIUM 4.5 G: 500; 4 INJECTION, SOLUTION INTRAVENOUS at 04:30

## 2021-09-29 RX ADMIN — FAMOTIDINE 20 MG: 10 INJECTION, SOLUTION INTRAVENOUS at 04:11

## 2021-09-29 RX ADMIN — OXYCODONE HYDROCHLORIDE 10 MG: 5 TABLET ORAL at 20:31

## 2021-09-29 RX ADMIN — Medication 1 DROP: at 17:12

## 2021-09-29 RX ADMIN — Medication 1 DROP: at 23:00

## 2021-09-29 RX ADMIN — LORAZEPAM 1 MG: 1 TABLET ORAL at 17:12

## 2021-09-29 RX ADMIN — OXYCODONE HYDROCHLORIDE 10 MG: 5 TABLET ORAL at 04:07

## 2021-09-29 RX ADMIN — OXYCODONE HYDROCHLORIDE 10 MG: 5 TABLET ORAL at 14:43

## 2021-09-29 RX ADMIN — SODIUM CHLORIDE: 9 INJECTION, SOLUTION INTRAVENOUS at 04:08

## 2021-09-29 RX ADMIN — TAZOBACTAM SODIUM AND PIPERACILLIN SODIUM 4.5 G: 500; 4 INJECTION, SOLUTION INTRAVENOUS at 11:52

## 2021-09-29 RX ADMIN — OXYCODONE HYDROCHLORIDE 10 MG: 5 TABLET ORAL at 00:27

## 2021-09-29 RX ADMIN — LORAZEPAM 1 MG: 1 TABLET ORAL at 04:24

## 2021-09-29 RX ADMIN — TAZOBACTAM SODIUM AND PIPERACILLIN SODIUM 4.5 G: 500; 4 INJECTION, SOLUTION INTRAVENOUS at 18:48

## 2021-09-29 RX ADMIN — LORAZEPAM 1 MG: 1 TABLET ORAL at 23:01

## 2021-09-29 RX ADMIN — TAZOBACTAM SODIUM AND PIPERACILLIN SODIUM 4.5 G: 500; 4 INJECTION, SOLUTION INTRAVENOUS at 23:01

## 2021-09-29 RX ADMIN — FAMOTIDINE 20 MG: 10 INJECTION, SOLUTION INTRAVENOUS at 16:44

## 2021-09-29 ASSESSMENT — ACTIVITIES OF DAILY LIVING (ADL)
ADLS_ACUITY_SCORE: 3

## 2021-09-29 NOTE — PROGRESS NOTES
COLON & RECTAL SURGERY  PROGRESS NOTE    September 29, 2021    SUBJECTIVE:  Patient reports he continues to feel better.  Abdominal pain is improved.  Fevers are improving.  +flatus.  No BM since day of admission.  No nausea/vomiting.      OBJECTIVE:  Temp:  [98.5  F (36.9  C)-100.7  F (38.2  C)] 98.5  F (36.9  C)  Pulse:  [] 88  Resp:  [10-20] 10  BP: (128-144)/(73-89) 134/73  SpO2:  [95 %-98 %] 95 %    Intake/Output Summary (Last 24 hours) at 9/29/2021 0831  Last data filed at 9/29/2021 0407  Gross per 24 hour   Intake 770 ml   Output 2500 ml   Net -1730 ml       GENERAL:  Awake, alert, no acute distress, lying in bed.  HEAD: Nomocephalic atraumatic  SCLERA: anicteric  EXTREMITIES: warm and well perfused  ABDOMEN:  Soft, round, mildly tender over left abdomen, no rebound or guarding, no peritoneal signs.    LABS:  Lab Results   Component Value Date    WBC 10.2 09/29/2021    WBC 14.5 07/06/2020     Lab Results   Component Value Date    HGB 11.6 09/29/2021    HGB 16.2 07/06/2020     Lab Results   Component Value Date    HCT 36.4 09/29/2021    HCT 49.1 07/06/2020     Lab Results   Component Value Date     09/29/2021     07/06/2020     Last Basic Metabolic Panel:  Lab Results   Component Value Date     09/29/2021     07/06/2020      Lab Results   Component Value Date    POTASSIUM 3.4 09/29/2021    POTASSIUM 4.0 07/06/2020     Lab Results   Component Value Date    CHLORIDE 102 09/29/2021    CHLORIDE 102 07/06/2020     Lab Results   Component Value Date    SERENE 8.5 09/29/2021    SERENE 8.9 07/06/2020     Lab Results   Component Value Date    CO2 25 09/29/2021    CO2 27 07/06/2020     Lab Results   Component Value Date    BUN 9 09/29/2021    BUN 11 07/06/2020     Lab Results   Component Value Date    CR 0.89 09/29/2021    CR 0.83 07/06/2020     Lab Results   Component Value Date    GLC 92 09/29/2021     07/06/2020       ASSESSMENT/PLAN:  40 year old man admitted with recurrent  diverticulitis. COVID-19 positive.  Tmax 100.7F in last 24 hours.  WBC 10.2.     - Clear liquids  - IV fluids  - IV Zosyn  - Pain management as needed  - Strict I&Os  - Encourage ambulation as able  - Serial abdominal exams  - Please call us if patient clinically declining      For questions/paging, please contact the CRS office at [phone].    Trupti Montague PA-C  [facility]  Phone: [phone]

## 2021-09-29 NOTE — PLAN OF CARE
Up independently in room, asking to ambulate in the [place] as he is convinced that he does not have COVID-19.  Abdomen round and distended.  BS very faint.  Denies flatus.  Voiding in urinal dark love urine. Temp 100.7 at one point, down to 98.9. with environmental modifications. IV Dilaudid and Oxycodone administered. IVF infusing. Hoping to advance his diet and discharge tomorrow. Will keep monitoring.

## 2021-09-29 NOTE — PLAN OF CARE
Patient A/OX4, afebrile. Up independently in room. Patient voids per urinal. Urine dark love. Patient denies flatus. PRN oxy given twice this shift and PRN ativan given. Will continue to monitor.

## 2021-09-29 NOTE — PLAN OF CARE
RN [id number]  Pt is A/Ox4, VS monitored. Minimal pain to abdomen. Hypoactive BS. Diet advanced to full liquid- tolerated well. PRN Ativan given for anxiety. Up independently. Voiding. Plan @ discharge is home. Will continue POC.

## 2021-09-29 NOTE — PLAN OF CARE
Pt is up independently in his room. Pt denies any cough or SOB.  Pts pain is controlled with po oxycodone.   Pt was started on clears and tolerated without any pain or nausea.  Pt is hoping to advance diet and be able to discharge to home tomorrow.

## 2021-09-29 NOTE — PROGRESS NOTES
"Colon & Rectal Surgery Progress Note             Interval History:   Hospital day # 3 with diverticulitis of the proximal descending colon.   Feeling better. Got 40 mg of oxycodone and 2 mg of dilaudid IV yesterday.  + flatus. No BM since admission. Tolerating clears.  Tmax 101.3 yesterday morning. WBC 11                    Medications:   I have reviewed this patient's current medications               Physical Exam:   Blood pressure 134/73, pulse 88, temperature 98.5  F (36.9  C), temperature source Temporal, resp. rate 10, height 1.854 m (6' 1\"), weight 122.8 kg (270 lb 11.2 oz), SpO2 95 %.    Intake/Output Summary (Last 24 hours) at 9/29/2021 0935  Last data filed at 9/29/2021 0407  Gross per 24 hour   Intake 770 ml   Output 2500 ml   Net -1730 ml     GEN:  alert  ABD:  Less flank tenderness, no rebound or guarding.          Data:        Lab Results   Component Value Date     09/29/2021     07/06/2020    Lab Results   Component Value Date    CHLORIDE 102 09/29/2021    CHLORIDE 102 07/06/2020    Lab Results   Component Value Date    BUN 9 09/29/2021    BUN 11 07/06/2020      Lab Results   Component Value Date    POTASSIUM 3.4 09/29/2021    POTASSIUM 4.0 07/06/2020    Lab Results   Component Value Date    CO2 25 09/29/2021    CO2 27 07/06/2020    Lab Results   Component Value Date    CR 0.89 09/29/2021    CR 0.97 09/28/2021    CR 0.83 07/06/2020    CR 0.76 12/21/2019        Lab Results   Component Value Date    HGB 11.6 (L) 09/29/2021    HGB 12.0 (L) 09/28/2021     Lab Results   Component Value Date     09/29/2021     09/28/2021     Lab Results   Component Value Date    WBC 10.2 09/29/2021    WBC 16.4 (H) 09/28/2021            Assessment and Plan:   Seems to be clinically improving now on 3rd day of IV abx.   Clears okay, IV zosyn  Await bowel function  Minimize narcotics.   Encourage ambulation.     Arabella Vaughan MD  [facility]  Office Phone # [phone]    "

## 2021-09-29 NOTE — PROGRESS NOTES
[facility]    Hospitalist Progress Note  Name: Pardeep Waite    MRN: [id number]  Provider: Aura Galarza MD  Date of Service: 09/29/2021    Assessment & Plan   Summary of Stay: Pardeep Waite is a 40 year old male who was admitted on 9/27/2021 for sepsis due to acute diverticulitis.  Patient's past medical history significant for recent COVID-19 infection diagnosed 8/13/2021 treated with IV steroids and discharged home, psoriatic  arthritis on Enbrel, previous history of sigmoid diverticulitis presented with left upper abdominal pain.    In the emergency room patient was febrile tachycardic hypertensive elevated LFTs leukocytosis.  CT abdomen pelvis showed acute diverticulitis and groundglass appearance of both lower lung bases.    #Sepsis due to acute diverticulitis of proximal to mid descending colon:  -On presentation febrile tachycardic leukocytosis abnormal CT findings  - follow blood cultures  - continue IV Zosyn   - IVF hydration with NS at 100 ml/hour  - started on clears today  - supportive care with antiemetics and pain control  - CRS surgery consult due to recurrent issue with diverticulitis   -WBC count trending down       #Recent COVID infection, recovered: unvaccinated, diagnosed with COVID on 8/13/21 at outside facility and was seen in the ED on 8/23 where he received a dose of IV Solumedrol and prescribed an inhaler and discharged home. CT scan on 9/27 noted very mild ground glass opacities at the left lung base that may be infectious or inflammatory, likely related to recovering COVID infection. Denies recent cough, chest pain, or shortness of breath. No episodes of hypoxia.  - no documentation of positive test in chart or care everywhere  - keep on isolations precautions for now     #Fatty liver  #Elevated LFTs: total bilirubin of 1.4, ALT of 85, and AST of 51, intermittently elevated in the past when acutely ill.  - will fiollow LFTs     #Psoriatic arthritis: follows with Dr. Montalvo  of rheumatology at [facility],  - hold Enbrel due to acute infection. Usually receives Enbrel injection every Thursday.     #Drop in hemoglobin  -Likely due to fluid  -We will recheck in a.m.    DVT Prophylaxis: Pneumatic Compression Devices  Code Status: Full Code    Disposition: Expected discharge to be decided.      Interval History   Reviewed chart.  Continues to have abdominal pain though somewhat better in different position.  Pain worse on breathing. started on clears tolerated p.o.  More than 10 point review of systems was carried out and was otherwise negative.    -Data reviewed today: I reviewed all new labs and imaging reports over the last 24 hours. I personally reviewed no images or EKG's today.    Physical Exam   Temp: 97.9  F (36.6  C) Temp src: Temporal BP: 133/82 Pulse: 80   Resp: 16 SpO2: 93 % O2 Device: None (Room air)    Vitals:    09/27/21 1434   Weight: 122.8 kg (270 lb 11.2 oz)     Vital Signs with Ranges  Temp:  [97.9  F (36.6  C)-100.7  F (38.2  C)] 97.9  F (36.6  C)  Pulse:  [] 80  Resp:  [10-20] 16  BP: (128-144)/(73-89) 133/82  SpO2:  [93 %-98 %] 93 %  I/O last 3 completed shifts:  In: 770 [I.V.:770]  Out: 2500 [Urine:2500]      GEN:  Alert, oriented x 3, appears comfortable, NAD.  HEENT:  Normocephalic/atraumatic, no scleral icterus, no nasal discharge, mouth moist.  CV:  Regular rate and rhythm, no murmur or JVD.  S1 + S2 noted, no S3 or S4.  LUNGS:  Clear to auscultation bilaterally without rales/rhonchi/wheezing/retractions.  Symmetric chest rise on inhalation noted.  ABD: Decreased bowel sounds, distended, left lower quadrant tenderness.  EXT:  No edema.  No cyanosis.  No joint synovitis noted.  SKIN:  Dry to touch, no exanthems noted in the visualized areas.    Medications     sodium chloride 100 mL/hr at 09/29/21 0408       famotidine  20 mg Intravenous Q12H     piperacillin-tazobactam  4.5 g Intravenous Q6H     sodium chloride (PF)  3 mL Intracatheter Q8H     Data     No  results for input(s): PH, PHV, PO2, PO2V, SAT, PCO2, PCO2V, HCO3, HCO3V in the last 168 hours.  Recent Labs   Lab 09/29/21  0732 09/28/21 0644 09/27/21  1054   WBC 10.2 16.4* 22.1*   HGB 11.6* 12.0* 15.0   HCT 36.4* 37.5* 46.4   MCV 88 88 89    194 282     Recent Labs   Lab 09/29/21  0732 09/28/21  0644 09/27/21  1054    135 137   POTASSIUM 3.4 3.4 3.8   CHLORIDE 102 102 101   CO2 25 29 27   ANIONGAP 8 4 9   GLC 92 106* 114*   BUN 9 12 9   CR 0.89 0.97 0.91   GFRESTIMATED >90 >90 >90   SERENE 8.5 8.6 9.2     No results for input(s): CULT in the last 168 hours.  No results for input(s): NTBNPI, NTBNP in the last 168 hours.  No results for input(s): CKT in the last 168 hours.    Invalid input(s): CK, CK TOTAL  Recent Labs   Lab 09/29/21  0732 09/28/21  0644 09/27/21  1054   CR 0.89 0.97 0.91     No results for input(s): DD in the last 168 hours.  No results for input(s): SED, CRP in the last 168 hours.  Recent Labs   Lab 09/29/21  0732 09/28/21 0644 09/27/21  1054   GLC 92 106* 114*     Recent Labs   Lab 09/29/21  07 09/28/21  0644 09/27/21  1054   HGB 11.6* 12.0* 15.0     Recent Labs   Lab 09/28/21  0644 09/27/21  1054   AST 45 51*   ALT 81* 85*   ALKPHOS 84 105   BILITOTAL 2.3* 1.4*     No results for input(s): INR in the last 168 hours.  Recent Labs   Lab 09/27/21  1054   LACT 1.8     No results for input(s): LIPASE in the last 168 hours.  Recent Labs   Lab 09/29/21  0732 09/28/21  0644 09/27/21  1054   BUN 9 12 9   CR 0.89 0.97 0.91     No results for input(s): TSH in the last 168 hours.  No results for input(s): TROPONIN, TROPI, TROPR in the last 168 hours.    Invalid input(s): TROP, TROPONINIES  Recent Labs   Lab 09/27/21  1447   COLOR Yellow   APPEARANCE Clear   URINEGLC Negative   URINEBILI Negative   URINEKETONE Negative   SG 1.005   UBLD Negative   URINEPH 7.5*   PROTEIN 50 *   NITRITE Negative   LEUKEST Negative   RBCU 1   WBCU 1       No results found for this or any previous visit (from the  past 24 hour(s)).

## 2021-09-30 VITALS
BODY MASS INDEX: 35.88 KG/M2 | OXYGEN SATURATION: 93 % | HEART RATE: 84 BPM | DIASTOLIC BLOOD PRESSURE: 67 MMHG | RESPIRATION RATE: 16 BRPM | TEMPERATURE: 99.5 F | WEIGHT: 270.7 LBS | SYSTOLIC BLOOD PRESSURE: 109 MMHG | HEIGHT: 73 IN

## 2021-09-30 LAB
ANION GAP SERPL CALCULATED.3IONS-SCNC: 4 MMOL/L (ref 3–14)
BUN SERPL-MCNC: 7 MG/DL (ref 7–30)
CALCIUM SERPL-MCNC: 8.8 MG/DL (ref 8.5–10.1)
CHLORIDE BLD-SCNC: 104 MMOL/L (ref 94–109)
CO2 SERPL-SCNC: 29 MMOL/L (ref 20–32)
CREAT SERPL-MCNC: 0.9 MG/DL (ref 0.66–1.25)
ERYTHROCYTE [DISTWIDTH] IN BLOOD BY AUTOMATED COUNT: 12.5 % (ref 10–15)
GFR SERPL CREATININE-BSD FRML MDRD: >90 ML/MIN/1.73M2
GLUCOSE BLD-MCNC: 99 MG/DL (ref 70–99)
HCT VFR BLD AUTO: 35.9 % (ref 40–53)
HGB BLD-MCNC: 11.5 G/DL (ref 13.3–17.7)
MCH RBC QN AUTO: 28 PG (ref 26.5–33)
MCHC RBC AUTO-ENTMCNC: 32 G/DL (ref 31.5–36.5)
MCV RBC AUTO: 87 FL (ref 78–100)
PLATELET # BLD AUTO: 221 10E3/UL (ref 150–450)
POTASSIUM BLD-SCNC: 3.5 MMOL/L (ref 3.4–5.3)
RBC # BLD AUTO: 4.11 10E6/UL (ref 4.4–5.9)
SODIUM SERPL-SCNC: 137 MMOL/L (ref 133–144)
WBC # BLD AUTO: 6.4 10E3/UL (ref 4–11)

## 2021-09-30 PROCEDURE — 85027 COMPLETE CBC AUTOMATED: CPT | Performed by: INTERNAL MEDICINE

## 2021-09-30 PROCEDURE — 258N000003 HC RX IP 258 OP 636: Performed by: PHYSICIAN ASSISTANT

## 2021-09-30 PROCEDURE — 80048 BASIC METABOLIC PNL TOTAL CA: CPT | Performed by: INTERNAL MEDICINE

## 2021-09-30 PROCEDURE — 250N000013 HC RX MED GY IP 250 OP 250 PS 637: Performed by: PHYSICIAN ASSISTANT

## 2021-09-30 PROCEDURE — 250N000011 HC RX IP 250 OP 636: Performed by: PHYSICIAN ASSISTANT

## 2021-09-30 PROCEDURE — 36415 COLL VENOUS BLD VENIPUNCTURE: CPT | Performed by: INTERNAL MEDICINE

## 2021-09-30 PROCEDURE — 250N000013 HC RX MED GY IP 250 OP 250 PS 637: Performed by: HOSPITALIST

## 2021-09-30 PROCEDURE — 99239 HOSP IP/OBS DSCHRG MGMT >30: CPT | Performed by: INTERNAL MEDICINE

## 2021-09-30 PROCEDURE — 250N000013 HC RX MED GY IP 250 OP 250 PS 637: Performed by: INTERNAL MEDICINE

## 2021-09-30 RX ADMIN — Medication 1 DROP: at 06:11

## 2021-09-30 RX ADMIN — LORAZEPAM 1 MG: 1 TABLET ORAL at 06:11

## 2021-09-30 RX ADMIN — Medication 1 DROP: at 03:38

## 2021-09-30 RX ADMIN — SODIUM CHLORIDE: 9 INJECTION, SOLUTION INTRAVENOUS at 03:27

## 2021-09-30 RX ADMIN — TAZOBACTAM SODIUM AND PIPERACILLIN SODIUM 4.5 G: 500; 4 INJECTION, SOLUTION INTRAVENOUS at 06:11

## 2021-09-30 RX ADMIN — OXYCODONE HYDROCHLORIDE 10 MG: 5 TABLET ORAL at 03:29

## 2021-09-30 ASSESSMENT — ACTIVITIES OF DAILY LIVING (ADL)
ADLS_ACUITY_SCORE: 3

## 2021-09-30 NOTE — PLAN OF CARE
A&Ox4. VSS except low grade temp at 99.5. Taking Oxycodone 10mg for pain and Ativan for anxiety. IND in room. Tolerating a full liquid diet. Active BS. Abdomen tender on L side upon palpation. IV infusing. Plan is to discharge home today.

## 2021-09-30 NOTE — PROGRESS NOTES
COLON & RECTAL SURGERY  PROGRESS NOTE    September 30, 2021    SUBJECTIVE:  Feeling better, pain improved. Passing flatus and BMs. Denies n/v, tolerated full liquids, hungry. WBC 6.4, Hgb stable. Tmax 99.5. Stable.    OBJECTIVE:  Temp:  [97.9  F (36.6  C)-99.5  F (37.5  C)] 99.5  F (37.5  C)  Pulse:  [80-88] 84  Resp:  [16] 16  BP: (109-158)/(67-95) 109/67  SpO2:  [93 %-98 %] 93 %    Intake/Output Summary (Last 24 hours) at 9/30/2021 0940  Last data filed at 9/30/2021 0600  Gross per 24 hour   Intake 4880 ml   Output 1400 ml   Net 3480 ml       GENERAL:  Awake, alert, no acute distress  HEAD: Normocephalic atraumatic  SCLERA: Anicteric  EXTREMITIES: Warm and well perfused  ABDOMEN:  Soft, non-tender, non-distended. No guarding, rigidity, or peritoneal signs.    LABS:  Lab Results   Component Value Date    WBC 6.4 09/30/2021    WBC 14.5 07/06/2020     Lab Results   Component Value Date    HGB 11.5 09/30/2021    HGB 16.2 07/06/2020     Lab Results   Component Value Date    HCT 35.9 09/30/2021    HCT 49.1 07/06/2020     Lab Results   Component Value Date     09/30/2021     07/06/2020     Last Basic Metabolic Panel:  Lab Results   Component Value Date     09/30/2021     07/06/2020      Lab Results   Component Value Date    POTASSIUM 3.5 09/30/2021    POTASSIUM 4.0 07/06/2020     Lab Results   Component Value Date    CHLORIDE 104 09/30/2021    CHLORIDE 102 07/06/2020     Lab Results   Component Value Date    SERENE 8.8 09/30/2021    SERENE 8.9 07/06/2020     Lab Results   Component Value Date    CO2 29 09/30/2021    CO2 27 07/06/2020     Lab Results   Component Value Date    BUN 7 09/30/2021    BUN 11 07/06/2020     Lab Results   Component Value Date    CR 0.90 09/30/2021    CR 0.83 07/06/2020     Lab Results   Component Value Date    GLC 99 09/30/2021     07/06/2020       ASSESSMENT/PLAN: 40 year old man admitted with recurrent diverticulitis. COVID-19 positive.  Tmax 99.5F in last 24 hours.   WBC 6.4. Pain improving, having bowel function, stable. Ok to discharge today from CRS perspective. Should stay on low fiber diet for a week. Transition to PO Augmentin for 10 days. He may follow up with CRS outpatient as needed.    1. Low fiber diet, continue for one week  2. PRN pain meds  3. IVF  4. OOB, ambulate  5. PO Augmentin for 10 days  6. Ok to discharge from CRS perspective, can follow up with us as needed    Discussed with Dr. Vaughan.    For questions/paging, please contact the CRS office at [phone].    Ameya Bates PA-C  Colorectal Physician Assistant    [facility]  [address] [address]  T: [phone]  F: [phone]        Colon and Rectal Surgery Attending Note    Patient seen and examined independently.  Agree with above assessment and plan.  Feeling much better. + bm and flatus.  abd soft with minimal tenderness.  Plan   Advance to low fiber diet.   Home on oral abx.     Arabella Vaughan MD  [facility]  [address], [address]  [place] [address]  T: [phone]  F: [phone]   [email or web address]

## 2021-09-30 NOTE — DISCHARGE SUMMARY
[facility]  Discharge Summary  Hospitalist      Date of Admission:  9/27/2021  Date of Discharge:  9/30/2021  Provider:  Aura Galarza MD  Date of Service (when I last saw the patient): 09/30/21      Primary Provider: [facility]          Discharge Diagnosis:   Discharge Diagnoses   Left AMA.  Patient left before reassessment prior to scheduled discharge later today.  She left without discharge instructions  Sepsis due to acute diverticulitis  Recent COVID-19 infection recovered  Elevated LFTs      Other medical issues:  Past Medical History:   Diagnosis Date     Arthritis           History of Present Illness   Pardeep Waite is an 40 year old male who presented with severe abdominal pain.  Please see the admission history and physical for full details.    Hospital Course     Pardeep Waite was admitted on 9/27/2021.  He is a 40 year old male who was admitted for sepsis due to acute diverticulitis.  Patient's past medical history significant for recent COVID-19 infection diagnosed 8/13/2021 treated with IV steroids and discharged home, psoriatic  arthritis on Enbrel, previous history of sigmoid diverticulitis presented with left upper abdominal pain.     In the emergency room patient was febrile tachycardic hypertensive elevated LFTs leukocytosis.  CT abdomen pelvis showed acute diverticulitis and groundglass appearance of both lower lung bases.conservatively with IV antibiotics.  Colorectal surgery was consulted and was following.  Patient clinically improved was able to tolerate low fiber diet went home on oral antibiotics.  Plans to follow-up with primary care provider and colorectal surgery.  Patient did leave AMA did not complete the final day discharge.  Left before the discharge orders were placed or discharge instructions were given.  Unable to communicate risk-benefit and other critical follow-up information.    The following problems were addressed during his  hospitalization:    #Sepsis due to acute diverticulitis of proximal to mid descending colon:  -On presentation febrile tachycardic leukocytosis abnormal CT findings  -Blood cultures with no growth to date  -IV Zosyn in hospital discharged home on Augmentin.  Patient left before medications were called.  As requested by patient medication was called to [facility] pharmacy in liquid  - IVF hydration with NS while in hospitalization  -Diet advanced to low fiber  - supportive care with antiemetics and pain control  - CRS surgery consulted and will follow along    #Recent COVID infection, recovered: unvaccinated, diagnosed with COVID on 8/13/21 at outside facility and was seen in the ED on 8/23 where he received a dose of IV Solumedrol and prescribed an inhaler and discharged home. CT scan on 9/27 noted very mild ground glass opacities at the left lung base that may be infectious or inflammatory, likely related to recovering COVID infection. Denies recent cough, chest pain, or shortness of breath. No episodes of hypoxia.  - no documentation of positive test in chart or care everywhere  - keep on isolations precautions for now     #Fatty liver  #Elevated LFTs: total bilirubin of 1.4, ALT of 85, and AST of 51, intermittently elevated in the past when acutely ill.  -Patient will need to follow-up with LFTs as outpatient     #Psoriatic arthritis: follows with Dr. Montalvo of rheumatology at [facility],  - hold Enbrel due to acute infection. Usually receives Enbrel injection every Thursday.   -follow up with rheumatology     #Drop in hemoglobin  -Likely due to fluid  Recheck was stable   patient advised to follow-up with [facility]     Significant Results and Procedures   As noted above  Pending Results   Unresulted Labs Ordered in the Past 30 Days of this Admission     Date and Time Order Name Status Description    9/27/2021 11:12 AM Blood Culture Hand, Right Preliminary     9/27/2021 11:12 AM Blood Culture  Hand, Left Preliminary           Code Status   Full Code       Primary Care Physician   [facility]    Physical Exam   Temp: 99.5  F (37.5  C) Temp src: Temporal BP: 109/67 Pulse: 84   Resp: 16 SpO2: 93 % O2 Device: None (Room air)    Vitals:    09/27/21 1434   Weight: 122.8 kg (270 lb 11.2 oz)     Vital Signs with Ranges  Temp:  [98.3  F (36.8  C)-99.5  F (37.5  C)] 99.5  F (37.5  C)  Pulse:  [84-88] 84  Resp:  [16] 16  BP: (109-158)/(67-95) 109/67  SpO2:  [93 %-98 %] 93 %  I/O last 3 completed shifts:  In: 4880 [P.O.:1200; I.V.:3680]  Out: 1400 [Urine:1400]    Constitutional: Awake, alert, cooperative, no apparent distress, and appears stated age.  Respiratory: No increased work of breathing, good air exchange, clear to auscultation bilaterally, no crackles or wheezing.  Cardiovascular: Normal apical impulse,normal S1 and S2,   GI: normal bowel sounds, soft, non-distended, minimal left-sided tenderness      Discharge Disposition   Discharged to home    Consultations This Hospital Stay   COLORECTAL SURGERY IP CONSULT    Time Spent on this Encounter   I, Aura Galarza MD, personally saw the patient today and spent greater than 30 minutes discharging this patient.    Discharge Orders      Follow-up and recommended labs and tests     Follow up with Dr. Vaughan of [facility] as needed if you would like to discuss surgery. Call our clinic at [phone] if you would like to schedule.     Reason for your hospital stay    Refer to discontinue summary     Follow-up and recommended labs and tests     Follow up with primary care provider, [facility], within 7 days for hospital follow- up.  No follow up labs or test are needed.  Follow-up with colorectal as recommended     Activity    Your activity upon discharge: activity as tolerated     Diet    Continue a low fiber diet for one week     Diet    Follow this diet upon discharge: Orders Placed This  Encounter      Low Fiber Diet      Diet     Discharge Medications   Current Discharge Medication List      START taking these medications    Details   !! amoxicillin-clavulanate (AUGMENTIN) 875-125 MG tablet Take 1 tablet by mouth 2 times daily for 10 days  Qty: 20 tablet, Refills: 0    Associated Diagnoses: Acute diverticulitis      !! amoxicillin-clavulanate (AUGMENTIN) 875-125 MG tablet Take 1 tablet by mouth 2 times daily for 10 days  Qty: 20 tablet, Refills: 0    Associated Diagnoses: Acute diverticulitis       !! - Potential duplicate medications found. Please discuss with provider.      CONTINUE these medications which have NOT CHANGED    Details   LORazepam (ATIVAN) 1 MG tablet Take 1 mg by mouth every 6 hours as needed for anxiety Pt take daily      albuterol (PROAIR HFA/PROVENTIL HFA/VENTOLIN HFA) 108 (90 Base) MCG/ACT inhaler Inhale 2 puffs into the lungs every 4 hours as needed for shortness of breath / dyspnea or wheezing  Qty: 8 g, Refills: 0         STOP taking these medications       etanercept (ENBREL) 50 MG/ML injection Comments:   Reason for Stopping:             Allergies   Allergies   Allergen Reactions     Contrast Dye Nausea and Vomiting     Data   Most Recent 3 CBC's:Recent Labs   Lab Test 09/30/21  0750 09/29/21  0732 09/28/21  0644   WBC 6.4 10.2 16.4*   HGB 11.5* 11.6* 12.0*   MCV 87 88 88    182 194      Most Recent 3 BMP's:  Recent Labs   Lab Test 09/30/21  0750 09/29/21  0732 09/28/21  0644    135 135   POTASSIUM 3.5 3.4 3.4   CHLORIDE 104 102 102   CO2 29 25 29   BUN 7 9 12   CR 0.90 0.89 0.97   ANIONGAP 4 8 4   SERENE 8.8 8.5 8.6   GLC 99 92 106*     Most Recent 2 LFT's:  Recent Labs   Lab Test 09/28/21  0644 09/27/21  1054   AST 45 51*   ALT 81* 85*   ALKPHOS 84 105   BILITOTAL 2.3* 1.4*     Most Recent INR's and Anticoagulation Dosing History:  Anticoagulation Dose History    There is no flowsheet data to display.       Most Recent 3 Troponin's:  Recent Labs   Lab Test  08/23/21  2303   TROPONIN <0.015     Most Recent Cholesterol Panel:No lab results found.  Most Recent 6 Bacteria Isolates From Any Culture (See EPIC Reports for Culture Details):  Recent Labs   Lab Test 07/06/20  2141 07/06/20  2129 06/20/19  1540 06/20/19  1405 12/23/16  1558 06/27/14  2230   CULT No growth No growth No growth No growth No Beta Streptococcus isolated No Salmonella, Shigella, Campylobacter, E. coli O157, Aeromonas, or Plesiomonas   isolated.    No Yersinia enterocolitica isolated     Most Recent TSH, T4 and A1c Labs:No lab results found.  Results for orders placed or performed during the hospital encounter of 09/27/21   CT Abdomen Pelvis w Contrast    Narrative    CT ABDOMEN PELVIS WITH CONTRAST 9/27/2021 11:57 AM    CLINICAL HISTORY: Diverticulitis suspected. Left-sided abdominal pain,  history of diverticulitis.    TECHNIQUE: CT scan of the abdomen and pelvis was performed following  injection of IV contrast. Multiplanar reformats were obtained. Dose  reduction techniques were used.  CONTRAST: 100 mL Isovue-370    COMPARISON: CT abdomen and pelvis 12/21/2019.    FINDINGS:   LOWER CHEST: New mild groundglass nodular opacities at the left lung  base.    HEPATOBILIARY: Diffuse hepatic steatosis. No significant mass. No bile  duct dilatation. No calcified gallstones.    PANCREAS: Normal.    SPLEEN: Normal.    ADRENAL GLANDS: Normal.    KIDNEYS/BLADDER: No significant mass, stones, or hydronephrosis. There  are simple or benign cysts. No follow up is needed.    BOWEL: Acute diverticulitis involving the proximal to mid descending  colon at the left lateral abdomen, series 3 image 119. There is  adjacent small layering fluid posteriorly. No abscess or free air.  Normal appendix. No obstruction.    PELVIC ORGANS: Normal.    ADDITIONAL FINDINGS: None.    MUSCULOSKELETAL: No acute abnormality.      Impression    IMPRESSION:   1.  Acute diverticulitis involving the proximal to mid descending  colon at the  left flank. There is adjacent small fluid but no abscess  at this time.  2.  Fatty liver.  3.  New finding of very mild groundglass opacities at the left lung  base that may relate to an infectious or inflammatory cause.    RAQUEL GARVEY MD         SYSTEM ID:  [id number]           Disclaimer: This note consists of symbols derived from keyboarding, dictation and/or voice recognition software. As a result, there may be errors in the script that have gone undetected. Please consider this when interpreting information found in this chart.

## 2021-10-01 ENCOUNTER — PATIENT OUTREACH (OUTPATIENT)
Dept: CARE COORDINATION | Facility: CLINIC | Age: 41
End: 2021-10-01

## 2021-10-01 DIAGNOSIS — Z71.89 OTHER SPECIFIED COUNSELING: ICD-10-CM

## 2021-10-01 NOTE — PROGRESS NOTES
Clinic Care Coordination Contact  [facility]/Voicemail       Clinical Data: Care Coordinator Outreach  Outreach attempted x 1.  Left message on patient's voicemail with call back information and requested return call.  Plan: Care Coordinator will try to reach patient again in 1-2 business days.      Ramona Mckeon  Community Health Worker  [facility]  Ph:[phone]

## 2021-10-02 LAB
BACTERIA BLD CULT: NO GROWTH
BACTERIA BLD CULT: NO GROWTH

## 2021-10-02 NOTE — PROGRESS NOTES
Clinic Care Coordination Contact  [facility]/Voicemail       Clinical Data: Care Coordinator Outreach  Outreach attempted x 2.  Left message on patient's voicemail with call back information and requested return call.  Plan: Care Coordinator will do no further outreaches at this time.    Christina Da Silva  Community Health Worker  [facility]  [phone]

## 2022-06-02 ENCOUNTER — HOSPITAL ENCOUNTER (EMERGENCY)
Facility: CLINIC | Age: 42
Discharge: LEFT BEFORE DISCHARGE | End: 2022-06-03
Payer: COMMERCIAL

## 2022-06-03 ENCOUNTER — HOSPITAL ENCOUNTER (EMERGENCY)
Facility: CLINIC | Age: 42
Discharge: HOME | End: 2022-06-03
Attending: EMERGENCY MEDICINE | Admitting: EMERGENCY MEDICINE
Payer: COMMERCIAL

## 2022-06-03 VITALS
WEIGHT: 265 LBS | TEMPERATURE: 98.1 F | RESPIRATION RATE: 18 BRPM | HEART RATE: 75 BPM | BODY MASS INDEX: 35.12 KG/M2 | HEIGHT: 73 IN | OXYGEN SATURATION: 93 % | DIASTOLIC BLOOD PRESSURE: 84 MMHG | SYSTOLIC BLOOD PRESSURE: 134 MMHG

## 2022-06-03 DIAGNOSIS — K57.32 DIVERTICULITIS OF COLON: ICD-10-CM

## 2022-06-03 LAB
ANION GAP SERPL CALCULATED.3IONS-SCNC: 4 MMOL/L (ref 3–14)
BASOPHILS # BLD AUTO: 0 10E3/UL (ref 0–0.2)
BASOPHILS NFR BLD AUTO: 0 %
BUN SERPL-MCNC: 13 MG/DL (ref 7–30)
CALCIUM SERPL-MCNC: 9.3 MG/DL (ref 8.5–10.1)
CHLORIDE BLD-SCNC: 106 MMOL/L (ref 94–109)
CO2 SERPL-SCNC: 25 MMOL/L (ref 20–32)
CREAT SERPL-MCNC: 0.76 MG/DL (ref 0.66–1.25)
EOSINOPHIL # BLD AUTO: 0.1 10E3/UL (ref 0–0.7)
EOSINOPHIL NFR BLD AUTO: 1 %
ERYTHROCYTE [DISTWIDTH] IN BLOOD BY AUTOMATED COUNT: 12.8 % (ref 10–15)
GFR SERPL CREATININE-BSD FRML MDRD: >90 ML/MIN/1.73M2
GLUCOSE BLD-MCNC: 112 MG/DL (ref 70–99)
HCT VFR BLD AUTO: 45.9 % (ref 40–53)
HGB BLD-MCNC: 15 G/DL (ref 13.3–17.7)
IMM GRANULOCYTES # BLD: 0.1 10E3/UL
IMM GRANULOCYTES NFR BLD: 1 %
LYMPHOCYTES # BLD AUTO: 1.2 10E3/UL (ref 0.8–5.3)
LYMPHOCYTES NFR BLD AUTO: 10 %
MCH RBC QN AUTO: 28.2 PG (ref 26.5–33)
MCHC RBC AUTO-ENTMCNC: 32.7 G/DL (ref 31.5–36.5)
MCV RBC AUTO: 86 FL (ref 78–100)
MONOCYTES # BLD AUTO: 1.2 10E3/UL (ref 0–1.3)
MONOCYTES NFR BLD AUTO: 9 %
NEUTROPHILS # BLD AUTO: 10.3 10E3/UL (ref 1.6–8.3)
NEUTROPHILS NFR BLD AUTO: 79 %
NRBC # BLD AUTO: 0 10E3/UL
NRBC BLD AUTO-RTO: 0 /100
PLAT MORPH BLD: NORMAL
PLATELET # BLD AUTO: 255 10E3/UL (ref 150–450)
POTASSIUM BLD-SCNC: 4 MMOL/L (ref 3.4–5.3)
RBC # BLD AUTO: 5.31 10E6/UL (ref 4.4–5.9)
RBC MORPH BLD: NORMAL
SODIUM SERPL-SCNC: 135 MMOL/L (ref 133–144)
WBC # BLD AUTO: 12.9 10E3/UL (ref 4–11)

## 2022-06-03 PROCEDURE — 96375 TX/PRO/DX INJ NEW DRUG ADDON: CPT

## 2022-06-03 PROCEDURE — 250N000011 HC RX IP 250 OP 636: Performed by: EMERGENCY MEDICINE

## 2022-06-03 PROCEDURE — 36415 COLL VENOUS BLD VENIPUNCTURE: CPT | Performed by: EMERGENCY MEDICINE

## 2022-06-03 PROCEDURE — 96374 THER/PROPH/DIAG INJ IV PUSH: CPT

## 2022-06-03 PROCEDURE — 250N000013 HC RX MED GY IP 250 OP 250 PS 637: Performed by: EMERGENCY MEDICINE

## 2022-06-03 PROCEDURE — 80048 BASIC METABOLIC PNL TOTAL CA: CPT | Performed by: EMERGENCY MEDICINE

## 2022-06-03 PROCEDURE — 85025 COMPLETE CBC W/AUTO DIFF WBC: CPT | Performed by: EMERGENCY MEDICINE

## 2022-06-03 PROCEDURE — 99285 EMERGENCY DEPT VISIT HI MDM: CPT | Mod: 25

## 2022-06-03 RX ORDER — ONDANSETRON 2 MG/ML
4 INJECTION INTRAMUSCULAR; INTRAVENOUS ONCE
Status: COMPLETED | OUTPATIENT
Start: 2022-06-03 | End: 2022-06-03

## 2022-06-03 RX ORDER — CIPROFLOXACIN 500 MG/1
500 TABLET, FILM COATED ORAL 2 TIMES DAILY
Qty: 20 TABLET | Refills: 0 | Status: SHIPPED | OUTPATIENT
Start: 2022-06-03 | End: 2022-06-13

## 2022-06-03 RX ORDER — METRONIDAZOLE 500 MG/1
500 TABLET ORAL ONCE
Status: COMPLETED | OUTPATIENT
Start: 2022-06-03 | End: 2022-06-03

## 2022-06-03 RX ORDER — CIPROFLOXACIN 500 MG/1
500 TABLET, FILM COATED ORAL ONCE
Status: COMPLETED | OUTPATIENT
Start: 2022-06-03 | End: 2022-06-03

## 2022-06-03 RX ORDER — OXYCODONE AND ACETAMINOPHEN 5; 325 MG/1; MG/1
1 TABLET ORAL EVERY 6 HOURS PRN
Qty: 12 TABLET | Refills: 0 | Status: SHIPPED | OUTPATIENT
Start: 2022-06-03 | End: 2022-06-06

## 2022-06-03 RX ORDER — ONDANSETRON 4 MG/1
4 TABLET, ORALLY DISINTEGRATING ORAL EVERY 6 HOURS PRN
Qty: 10 TABLET | Refills: 0 | Status: SHIPPED | OUTPATIENT
Start: 2022-06-03 | End: 2022-06-06

## 2022-06-03 RX ORDER — METRONIDAZOLE 500 MG/1
500 TABLET ORAL 3 TIMES DAILY
Qty: 30 TABLET | Refills: 0 | Status: SHIPPED | OUTPATIENT
Start: 2022-06-03 | End: 2022-06-13

## 2022-06-03 RX ADMIN — METRONIDAZOLE 500 MG: 500 TABLET ORAL at 09:19

## 2022-06-03 RX ADMIN — CIPROFLOXACIN 500 MG: 500 TABLET, FILM COATED ORAL at 09:19

## 2022-06-03 RX ADMIN — HYDROMORPHONE HYDROCHLORIDE 1 MG: 1 INJECTION, SOLUTION INTRAMUSCULAR; INTRAVENOUS; SUBCUTANEOUS at 09:19

## 2022-06-03 RX ADMIN — ONDANSETRON 4 MG: 2 INJECTION INTRAMUSCULAR; INTRAVENOUS at 09:20

## 2022-06-03 ASSESSMENT — ENCOUNTER SYMPTOMS
DIARRHEA: 0
NAUSEA: 0
ABDOMINAL PAIN: 1
VOMITING: 0
CHILLS: 1

## 2022-06-03 NOTE — ED PROVIDER NOTES
"  History   Chief Complaint:  Abdominal Pain       The history is provided by the patient.      Pardeep Waite is a 41 year old male with history of diverticulitis and obesity presents with abdominal pain. Patient notes left lower abdominal pain that he has experienced before due to his diverticulitis and has been to the ED several times for. Pain began yesterday morning and has been constant. Pardeep confirms chills but denies any nausea, vomiting, diarrhea or  issues. Took tylenol throughout the night and also took amoxacillin.     Review of Systems   Constitutional: Positive for chills.   Gastrointestinal: Positive for abdominal pain (left side). Negative for diarrhea, nausea and vomiting.   Genitourinary: Negative.    All other systems reviewed and are negative.    Allergies:  Contrast Dye    Medications:  albuterol (PROAIR HFA/PROVENTIL HFA/VENTOLIN HFA) 108 (90 Base) MCG/ACT inhaler  LORazepam (ATIVAN) 1 MG tablet  Adipex-P  Deltasone    Past Medical History:     Arthritis   Gout  Diverticulitis  Anxiety  Obesity    Past Surgical History:    Colonoscopy    Family History:    Father - Kidney disease    Social History:  Arrived in the ED alone via private vehicle.     Physical Exam     Patient Vitals for the past 24 hrs:   BP Temp Temp src Pulse Resp SpO2 Height Weight   06/03/22 0817 (!) 139/102 98.1  F (36.7  C) Temporal 104 18 98 % 1.854 m (6' 1\") 120.2 kg (265 lb)       Physical Exam  General/Appearance: appears stated age, well-groomed, appears comfortable  Eyes: EOMI, no scleral injection, no icterus  ENT: MMM  Neck: supple, nl ROM, no stiffness  Cardiovascular: mild tachy but regular, nl S1S2, no m/r/g, 2+ pulses in all 4 extremities, cap refill <2sec  Respiratory: CTAB, good air movement throughout, no wheezes/rhonchi/rales, no increased WOB, no retractions  GI: abd soft, non-distended, moderate LLQ ttp,  no HSM, no rebound, no guarding, nl BS  MSK: KAUFMAN, good tone, no bony abnormality  Skin: warm and " well-perfused, no rash, no edema, no ecchymosis, nl turgor  Neuro: GCS 15, alert and oriented, no gross focal neuro deficits  Psych: interacts appropriately  Heme: no petechia, no purpura, no active bleeding    Emergency Department Course     Laboratory:  Labs Ordered and Resulted from Time of ED Arrival to Time of ED Departure   CBC WITH PLATELETS AND DIFFERENTIAL - Abnormal       Result Value    WBC Count 12.9 (*)     RBC Count 5.31      Hemoglobin 15.0      Hematocrit 45.9      MCV 86      MCH 28.2      MCHC 32.7      RDW 12.8      Platelet Count 255      % Neutrophils 79      % Lymphocytes 10      % Monocytes 9      % Eosinophils 1      % Basophils 0      % Immature Granulocytes 1      NRBCs per 100 WBC 0      Absolute Neutrophils 10.3 (*)     Absolute Lymphocytes 1.2      Absolute Monocytes 1.2      Absolute Eosinophils 0.1      Absolute Basophils 0.0      Absolute Immature Granulocytes 0.1      Absolute NRBCs 0.0     RBC AND PLATELET MORPHOLOGY - Abnormal    Platelet Assessment        Value: Automated Count Confirmed. Platelet morphology is normal.    Elliptocytes Slight (*)     RBC Morphology Confirmed RBC Indices     BASIC METABOLIC PANEL    Sodium 135      Potassium 4.0      Chloride 106      Carbon Dioxide (CO2)        Anion Gap        Urea Nitrogen        Creatinine        Calcium        Glucose        GFR Estimate            Emergency Department Course:         Reviewed:  I reviewed nursing notes, vitals, past medical history and Care Everywhere    Assessments:  0825 I obtained history and examined the patient as noted above.       Interventions:  0919 Cipro 500 mg, PO  0919 Flagyl 500 mg, PO  0919 Dilaudid 1mg, IV  0920 Zofran 4 mg, IV    Disposition:  The patient was discharged to home.     Impression & Plan     Medical Decision Making:  This patient is a 41-year-old male with history of diverticulitis who presents with left lower quadrant pain that he states is similar to previous diverticulitis  episodes.  He is got tenderness to palpation in the left lower quadrant but no rebound or guarding.  He is afebrile and hemodynamically stable.  Given his familiarity with this diagnosis and how it presented him I think it is reasonable to treat this like diverticulitis and avoid a CT scan.  He will be started on Flagyl, Cipro, pain meds and nausea meds and discharged home.  He knows to return for worsening pain, increasing fever, other new or concerning symptoms.    Diagnosis:    ICD-10-CM    1. Diverticulitis of colon  K57.32        Discharge Medications:  New Prescriptions    CIPROFLOXACIN (CIPRO) 500 MG TABLET    Take 1 tablet (500 mg) by mouth 2 times daily for 10 days    METRONIDAZOLE (FLAGYL) 500 MG TABLET    Take 1 tablet (500 mg) by mouth 3 times daily for 10 days    ONDANSETRON (ZOFRAN ODT) 4 MG ODT TAB    Take 1 tablet (4 mg) by mouth every 6 hours as needed for nausea    OXYCODONE-ACETAMINOPHEN (PERCOCET) 5-325 MG TABLET    Take 1 tablet by mouth every 6 hours as needed for pain       Scribe Disclosure:  I, OMERO DAWKINS, am serving as a scribe at 8:25 AM on 6/3/2022 to document services personally performed by Myrtle Mcnally based on my observations and the provider's statements to me.            Myrtle Mcnally MD  06/03/22 1007

## 2022-06-03 NOTE — ED TRIAGE NOTES
Here for concern of left lower abdominal pain started yesterday morning associated with chills. Stated history of diverticulitis. ABCs intact.      Triage Assessment     Row Name 06/03/22 0816       Triage Assessment (Adult)    Airway WDL WDL       Respiratory WDL    Respiratory WDL WDL       Cardiac WDL    Cardiac WDL WDL

## 2022-08-17 ENCOUNTER — HOSPITAL ENCOUNTER (EMERGENCY)
Facility: CLINIC | Age: 42
Discharge: HOME | End: 2022-08-17
Attending: EMERGENCY MEDICINE | Admitting: EMERGENCY MEDICINE
Payer: COMMERCIAL

## 2022-08-17 ENCOUNTER — APPOINTMENT (OUTPATIENT)
Dept: CT IMAGING | Facility: CLINIC | Age: 42
End: 2022-08-17
Payer: COMMERCIAL

## 2022-08-17 VITALS
DIASTOLIC BLOOD PRESSURE: 89 MMHG | BODY MASS INDEX: 34.46 KG/M2 | OXYGEN SATURATION: 100 % | RESPIRATION RATE: 18 BRPM | HEIGHT: 73 IN | SYSTOLIC BLOOD PRESSURE: 133 MMHG | HEART RATE: 88 BPM | WEIGHT: 260 LBS | TEMPERATURE: 97.9 F

## 2022-08-17 DIAGNOSIS — K57.32 DIVERTICULITIS OF COLON: ICD-10-CM

## 2022-08-17 LAB
ANION GAP SERPL CALCULATED.3IONS-SCNC: 13 MMOL/L (ref 7–15)
BASOPHILS # BLD AUTO: 0 10E3/UL (ref 0–0.2)
BASOPHILS NFR BLD AUTO: 0 %
BUN SERPL-MCNC: 15.2 MG/DL (ref 6–20)
CALCIUM SERPL-MCNC: 9.9 MG/DL (ref 8.6–10)
CHLORIDE SERPL-SCNC: 99 MMOL/L (ref 98–107)
CREAT SERPL-MCNC: 0.86 MG/DL (ref 0.67–1.17)
DEPRECATED HCO3 PLAS-SCNC: 24 MMOL/L (ref 22–29)
EOSINOPHIL # BLD AUTO: 0.1 10E3/UL (ref 0–0.7)
EOSINOPHIL NFR BLD AUTO: 1 %
ERYTHROCYTE [DISTWIDTH] IN BLOOD BY AUTOMATED COUNT: 13.2 % (ref 10–15)
GFR SERPL CREATININE-BSD FRML MDRD: >90 ML/MIN/1.73M2
GLUCOSE SERPL-MCNC: 117 MG/DL (ref 70–99)
HCT VFR BLD AUTO: 46.5 % (ref 40–53)
HGB BLD-MCNC: 15.1 G/DL (ref 13.3–17.7)
HOLD SPECIMEN: NORMAL
IMM GRANULOCYTES # BLD: 0.1 10E3/UL
IMM GRANULOCYTES NFR BLD: 1 %
LYMPHOCYTES # BLD AUTO: 2 10E3/UL (ref 0.8–5.3)
LYMPHOCYTES NFR BLD AUTO: 13 %
MCH RBC QN AUTO: 28.4 PG (ref 26.5–33)
MCHC RBC AUTO-ENTMCNC: 32.5 G/DL (ref 31.5–36.5)
MCV RBC AUTO: 87 FL (ref 78–100)
MONOCYTES # BLD AUTO: 1.7 10E3/UL (ref 0–1.3)
MONOCYTES NFR BLD AUTO: 11 %
NEUTROPHILS # BLD AUTO: 11.6 10E3/UL (ref 1.6–8.3)
NEUTROPHILS NFR BLD AUTO: 74 %
NRBC # BLD AUTO: 0 10E3/UL
NRBC BLD AUTO-RTO: 0 /100
PLATELET # BLD AUTO: 309 10E3/UL (ref 150–450)
POTASSIUM SERPL-SCNC: 4.4 MMOL/L (ref 3.4–5.3)
RBC # BLD AUTO: 5.32 10E6/UL (ref 4.4–5.9)
SODIUM SERPL-SCNC: 136 MMOL/L (ref 136–145)
WBC # BLD AUTO: 15.6 10E3/UL (ref 4–11)

## 2022-08-17 PROCEDURE — 250N000011 HC RX IP 250 OP 636: Performed by: EMERGENCY MEDICINE

## 2022-08-17 PROCEDURE — 99285 EMERGENCY DEPT VISIT HI MDM: CPT | Mod: 25

## 2022-08-17 PROCEDURE — 250N000011 HC RX IP 250 OP 636

## 2022-08-17 PROCEDURE — 96376 TX/PRO/DX INJ SAME DRUG ADON: CPT

## 2022-08-17 PROCEDURE — 36415 COLL VENOUS BLD VENIPUNCTURE: CPT | Performed by: EMERGENCY MEDICINE

## 2022-08-17 PROCEDURE — 74177 CT ABD & PELVIS W/CONTRAST: CPT

## 2022-08-17 PROCEDURE — 85025 COMPLETE CBC W/AUTO DIFF WBC: CPT | Performed by: EMERGENCY MEDICINE

## 2022-08-17 PROCEDURE — 96375 TX/PRO/DX INJ NEW DRUG ADDON: CPT

## 2022-08-17 PROCEDURE — 96368 THER/DIAG CONCURRENT INF: CPT

## 2022-08-17 PROCEDURE — 82310 ASSAY OF CALCIUM: CPT | Performed by: EMERGENCY MEDICINE

## 2022-08-17 PROCEDURE — 82374 ASSAY BLOOD CARBON DIOXIDE: CPT | Performed by: EMERGENCY MEDICINE

## 2022-08-17 PROCEDURE — 250N000009 HC RX 250: Performed by: EMERGENCY MEDICINE

## 2022-08-17 PROCEDURE — 96365 THER/PROPH/DIAG IV INF INIT: CPT | Mod: 59

## 2022-08-17 RX ORDER — OXYCODONE AND ACETAMINOPHEN 5; 325 MG/1; MG/1
1 TABLET ORAL EVERY 6 HOURS PRN
Qty: 10 TABLET | Refills: 0 | Status: SHIPPED | OUTPATIENT
Start: 2022-08-17 | End: 2022-08-17

## 2022-08-17 RX ORDER — OXYCODONE AND ACETAMINOPHEN 5; 325 MG/1; MG/1
1 TABLET ORAL EVERY 6 HOURS PRN
Qty: 10 TABLET | Refills: 0 | Status: SHIPPED | OUTPATIENT
Start: 2022-08-17

## 2022-08-17 RX ORDER — METRONIDAZOLE 500 MG/100ML
500 INJECTION, SOLUTION INTRAVENOUS ONCE
Status: COMPLETED | OUTPATIENT
Start: 2022-08-17 | End: 2022-08-17

## 2022-08-17 RX ORDER — HYDROMORPHONE HYDROCHLORIDE 1 MG/ML
0.5 INJECTION, SOLUTION INTRAMUSCULAR; INTRAVENOUS; SUBCUTANEOUS
Status: COMPLETED | OUTPATIENT
Start: 2022-08-17 | End: 2022-08-17

## 2022-08-17 RX ORDER — ONDANSETRON 2 MG/ML
4 INJECTION INTRAMUSCULAR; INTRAVENOUS ONCE
Status: COMPLETED | OUTPATIENT
Start: 2022-08-17 | End: 2022-08-17

## 2022-08-17 RX ORDER — CIPROFLOXACIN 2 MG/ML
400 INJECTION, SOLUTION INTRAVENOUS ONCE
Status: COMPLETED | OUTPATIENT
Start: 2022-08-17 | End: 2022-08-17

## 2022-08-17 RX ORDER — IOPAMIDOL 755 MG/ML
500 INJECTION, SOLUTION INTRAVASCULAR ONCE
Status: COMPLETED | OUTPATIENT
Start: 2022-08-17 | End: 2022-08-17

## 2022-08-17 RX ADMIN — ONDANSETRON 4 MG: 2 INJECTION INTRAMUSCULAR; INTRAVENOUS at 10:21

## 2022-08-17 RX ADMIN — METRONIDAZOLE 500 MG: 500 INJECTION, SOLUTION INTRAVENOUS at 10:14

## 2022-08-17 RX ADMIN — SODIUM CHLORIDE 65 ML: 9 INJECTION, SOLUTION INTRAVENOUS at 10:28

## 2022-08-17 RX ADMIN — CIPROFLOXACIN 400 MG: 2 INJECTION, SOLUTION INTRAVENOUS at 10:14

## 2022-08-17 RX ADMIN — HYDROMORPHONE HYDROCHLORIDE 0.5 MG: 1 INJECTION, SOLUTION INTRAMUSCULAR; INTRAVENOUS; SUBCUTANEOUS at 10:01

## 2022-08-17 RX ADMIN — IOPAMIDOL 90 ML: 755 INJECTION, SOLUTION INTRAVENOUS at 10:28

## 2022-08-17 RX ADMIN — HYDROMORPHONE HYDROCHLORIDE 0.5 MG: 1 INJECTION, SOLUTION INTRAMUSCULAR; INTRAVENOUS; SUBCUTANEOUS at 11:18

## 2022-08-17 ASSESSMENT — ENCOUNTER SYMPTOMS
BACK PAIN: 0
FLANK PAIN: 0
NAUSEA: 0
BLOOD IN STOOL: 0
CHILLS: 0
ABDOMINAL PAIN: 1
VOMITING: 0
SHORTNESS OF BREATH: 0
COUGH: 0
HEMATURIA: 0
FEVER: 0
CONFUSION: 0
DYSURIA: 0
DIARRHEA: 0

## 2022-08-17 ASSESSMENT — ACTIVITIES OF DAILY LIVING (ADL)
ADLS_ACUITY_SCORE: 33
ADLS_ACUITY_SCORE: 35

## 2022-08-17 NOTE — DISCHARGE INSTRUCTIONS
You were seen and evaluated today in the emergency department for abdominal pain.  We found you to have another case of diverticulitis on your CT imaging.  Fortunately there was no complication of an abscess or perforation.  You were provided with IV antibiotics here.  You can be safely discharged home and complete the course of oral antibiotics prescribed from your primary care physician.  We would like you to follow-up closely with your primary care physician for recheck.  In the meantime, if you develop any new or worsening symptoms you can return here.  I did prescribe you 10 Percocet pills for pain management until you are able to get recheck with your primary care physician, as you mentioned that has worked for you in the past with bouts of diverticulitis and you have not been able to tolerate oxycodone well.

## 2022-08-17 NOTE — ED PROVIDER NOTES
Emergency Department Attending Supervision Note        I evaluated this patient with Ariellag PAC.       Briefly, the patient presented with recurrent symptoms of diverticulitis in the context of recent clinical diagnosis and initiation of antibiotics.  Given increasing pain, CT was performed to rule out perforation and fortunately shows no complication.  IV antibiotics administered.  Patient's pain is significantly improved on recheck and he would like to be discharged, which we believe is reasonable.  Plan close follow-up and colorectal surgery follow-up later this month as planned.  Return precautions for worse pain or other concerns.      In summary, my impression is     Visit Diagnosis, Associated Orders, and Comments     ICD-10-CM    1. Diverticulitis of colon  K57.32        Clyde Vázquez MD  [facility], [facility]        History     Chief Complaint:  Abdominal Pain      The history is provided by the patient.      Pardeep Waite is a 41 year old male with a history of diverticulitis who presents to the ED with abdominal pain.  Patient states he has had recurrent bouts of diverticulitis.  In fact, he was last seen here June 2 and given Cipro, Flagyl.  He has not had CT imaging for his diverticulitis since September 2021.  The patient states he is set up to have a surgical procedure for this at [facility] in [place] coming up in October.  The patient states that his pain came on yesterday at 11 AM.  This feels similar in quality to last bouts of diverticulitis but slightly more severe.  He called his primary care physician and was prescribed Cipro and Flagyl oral antibiotics, which he started yesterday at 1 PM.  He has not felt any improvement.  He denies any fever, nausea, vomiting, stool changes or blood in his stool.  No pain radiating into his back.  No urinary symptoms.    Review of Systems   Constitutional: Negative for chills and fever.   HENT: Negative for congestion.   "  Respiratory: Negative for cough and shortness of breath.    Cardiovascular: Negative for chest pain.   Gastrointestinal: Positive for abdominal pain. Negative for blood in stool, diarrhea, nausea and vomiting.   Genitourinary: Negative for dysuria, flank pain and hematuria.   Musculoskeletal: Negative for back pain.   Psychiatric/Behavioral: Negative for confusion.   All other systems reviewed and are negative.    Allergies:  Contrast Dye    Medications:    Albuterol   Ativan     Past Medical History:    Diverticulitis  Sepsis  Psoriatic arthritis  Gout   Anxiety   Obesity     Past Surgical History:    The patient denies pertinent past surgical history.     Family History:    Father - kidney disease     Social History:  The patient presents to the ED alone via private vehicle   Clinic, [facility]  Hx of alcohol use   The patient works as a      Physical Exam     Patient Vitals for the past 24 hrs:   BP Temp Temp src Pulse Resp SpO2 Height Weight   08/17/22 0852 (!) 150/95 97.9  F (36.6  C) Temporal 109 16 99 % 1.854 m (6' 1\") 117.9 kg (260 lb)       Physical Exam  General: Friendly middle-aged male sitting up on [facility], appears uncomfortable.  HENT: Patient wearing face mask. When taken off, mucous membranes appear moist.  Eyes: Conjunctive and sclera clear.  CV: Tachycardic rate and regular rhythm. Normal S1, S2. No appreciable murmurs, gallops or rubs.  Resp: Lungs clear to auscultation bilaterally. Normal respiratory effort. Speaks in full sentences. No stridor or cough observed.  GI: Abdomen soft, non distended.  Tenderness to palpation in the left lower quadrant with rebound.  MSK: Moves all extremities without difficulty. No lower extremity edema.  Skin: Warm and dry.  No rashes.  Neuro: Awake, alert, oriented. Cranial nerves grossly intact.  Psych: Cooperative. Normal affect.    Emergency Department Course   Imaging:  CT Abdomen Pelvis w Contrast  Final " Result  IMPRESSION:   1.  Acute diverticulitis centered on the mid descending colon and  milder inflammatory changes around the diverticular segment of sigmoid  colon. No abscess or free air.  2.  Hepatic steatosis.  LACHELLE MCINTOSH MD     Report per radiology    Laboratory:  Labs Ordered and Resulted from Time of ED Arrival to Time of ED Departure   BASIC METABOLIC PANEL - Abnormal       Result Value    Creatinine 0.86      Sodium 136      Potassium 4.4      Urea Nitrogen 15.2      Chloride 99      Carbon Dioxide (CO2) 24      Anion Gap 13      Glucose 117 (*)     GFR Estimate >90      Calcium 9.9     CBC WITH PLATELETS AND DIFFERENTIAL - Abnormal    WBC Count 15.6 (*)     RBC Count 5.32      Hemoglobin 15.1      Hematocrit 46.5      MCV 87      MCH 28.4      MCHC 32.5      RDW 13.2      Platelet Count 309      % Neutrophils 74      % Lymphocytes 13      % Monocytes 11      % Eosinophils 1      % Basophils 0      % Immature Granulocytes 1      NRBCs per 100 WBC 0      Absolute Neutrophils 11.6 (*)     Absolute Lymphocytes 2.0      Absolute Monocytes 1.7 (*)     Absolute Eosinophils 0.1      Absolute Basophils 0.0      Absolute Immature Granulocytes 0.1      Absolute NRBCs 0.0       Emergency Department Course:    Reviewed:  I reviewed nursing notes, vitals and past medical history    Assessments:  0916 I obtained history and examined the patient as noted above.   1106    I rechecked the patient and explained findings. I discussed plan for discharge home.     Interventions:  Medications   HYDROmorphone (PF) (DILAUDID) injection 0.5 mg (0.5 mg Intravenous Given 8/17/22 1118)   ciprofloxacin (CIPRO) infusion 400 mg (0 mg Intravenous Stopped 8/17/22 1127)   metroNIDAZOLE (FLAGYL) infusion 500 mg (0 mg Intravenous Stopped 8/17/22 1127)   ondansetron (ZOFRAN) injection 4 mg (4 mg Intravenous Given 8/17/22 1021)   CT Scan Flush (65 mLs Intravenous Given 8/17/22 1028)   iopamidol (ISOVUE-370) solution 500 mL (90 mLs  Intravenous Given 8/17/22 1028)     Disposition:  The patient was discharged to home.     Impression & Plan    Medical Decision Making:  Pardeep Waite is a 41 year old male with a history of diverticulitis who presented to the ED with abdominal pain per HPI above.  He is scheduled for a surgical procedure to address his recurrent diverticulitis coming up in October.  He had started on oral Cipro and Flagyl yesterday prescribed by his PCP.  On exam today, he was acutely tender in the left lower quadrant.  Blood pressure was slightly elevated and he was mildly tachycardic.  CBC was notable for a leukocytosis of 15.6.  IV fluids and IV Cipro and Flagyl were provided here in the emergency department.  Also, Dilaudid for pain management as he was extremely uncomfortable.  I did obtain advanced imaging out of concern for possible perforation or abscess complication.  Fortunately this did return negative for this.  I feel he can be safely discharged home and he can follow through with the oral antibiotics as prescribed by his primary care physician.  He will return to his primary care physician for recheck within the next few days.  In the meantime, if he develops any new or worsening symptoms he can return here.  All questions were answered prior to discharge and he remained hemodynamically stable throughout his course here.  Of note, the patient did not want to stay in the hospital for observation and pain control.  He states that Percocet had worked for him in the past for bouts of acute diverticulitis.  I did prescribe him 10 Percocet pills to help him through this acute portion of his illness.    Diagnosis:    ICD-10-CM    1. Diverticulitis of colon  K57.32      Discharge Medications:  The patient already has oral antibiotics as prescribed by his primary care physician and he can continue these as an outpatient.    Current Discharge Medication List      START taking these medications    Details   oxyCODONE-acetaminophen  (PERCOCET) 5-325 MG tablet Take 1 tablet by mouth every 6 hours as needed for pain  Qty: 10 tablet, Refills: 0           Scribe Disclosure:  I, Eranbianka Sims, an serving as a scribe at 0930 on 8/17/2022 to document services personally performed by Aliza Samuel PA-C base on my observations and the provider's statements to me.    Aliza Samuel PA-C  EPPA APC-T  I saw and evaluated this patient under attending provider Aliza Camacho PA-C  08/17/22 8346       Clyde Vázquez MD  08/17/22 1763

## 2022-08-17 NOTE — ED TRIAGE NOTES
Pt has had several recent visits within the last year for diverticulitis. States symptoms are flaring again and home pain meds are not working.     
Self

## 2025-05-09 ENCOUNTER — HOSPITAL ENCOUNTER (EMERGENCY)
Facility: CLINIC | Age: 45
Discharge: HOME | End: 2025-05-09
Attending: EMERGENCY MEDICINE | Admitting: EMERGENCY MEDICINE
Payer: COMMERCIAL

## 2025-05-09 ENCOUNTER — APPOINTMENT (OUTPATIENT)
Dept: CT IMAGING | Facility: CLINIC | Age: 45
End: 2025-05-09
Attending: EMERGENCY MEDICINE
Payer: COMMERCIAL

## 2025-05-09 VITALS
RESPIRATION RATE: 18 BRPM | BODY MASS INDEX: 37.49 KG/M2 | OXYGEN SATURATION: 99 % | TEMPERATURE: 98.5 F | WEIGHT: 282.85 LBS | HEIGHT: 73 IN | DIASTOLIC BLOOD PRESSURE: 77 MMHG | HEART RATE: 87 BPM | SYSTOLIC BLOOD PRESSURE: 125 MMHG

## 2025-05-09 DIAGNOSIS — K57.92 DIVERTICULITIS: ICD-10-CM

## 2025-05-09 DIAGNOSIS — R91.1 PULMONARY NODULE: ICD-10-CM

## 2025-05-09 LAB
ALBUMIN SERPL BCG-MCNC: 4.4 G/DL (ref 3.5–5.2)
ALBUMIN UR-MCNC: NEGATIVE MG/DL
ALP SERPL-CCNC: 84 U/L (ref 40–150)
ALT SERPL W P-5'-P-CCNC: 40 U/L (ref 0–70)
ANION GAP SERPL CALCULATED.3IONS-SCNC: 12 MMOL/L (ref 7–15)
APPEARANCE UR: CLEAR
AST SERPL W P-5'-P-CCNC: 33 U/L (ref 0–45)
BASOPHILS # BLD AUTO: 0 10E3/UL (ref 0–0.2)
BASOPHILS NFR BLD AUTO: 0 %
BILIRUB SERPL-MCNC: 0.6 MG/DL
BILIRUB UR QL STRIP: NEGATIVE
BUN SERPL-MCNC: 16.1 MG/DL (ref 6–20)
CALCIUM SERPL-MCNC: 9.4 MG/DL (ref 8.8–10.4)
CHLORIDE SERPL-SCNC: 103 MMOL/L (ref 98–107)
COLOR UR AUTO: ABNORMAL
CREAT SERPL-MCNC: 0.77 MG/DL (ref 0.67–1.17)
EGFRCR SERPLBLD CKD-EPI 2021: >90 ML/MIN/1.73M2
EOSINOPHIL # BLD AUTO: 0.1 10E3/UL (ref 0–0.7)
EOSINOPHIL NFR BLD AUTO: 1 %
ERYTHROCYTE [DISTWIDTH] IN BLOOD BY AUTOMATED COUNT: 12.5 % (ref 10–15)
GLUCOSE SERPL-MCNC: 97 MG/DL (ref 70–99)
GLUCOSE UR STRIP-MCNC: NEGATIVE MG/DL
HCO3 SERPL-SCNC: 23 MMOL/L (ref 22–29)
HCT VFR BLD AUTO: 42.8 % (ref 40–53)
HGB BLD-MCNC: 14.6 G/DL (ref 13.3–17.7)
HGB UR QL STRIP: NEGATIVE
IMM GRANULOCYTES # BLD: 0.1 10E3/UL
IMM GRANULOCYTES NFR BLD: 1 %
KETONES UR STRIP-MCNC: NEGATIVE MG/DL
LACTATE SERPL-SCNC: 0.8 MMOL/L (ref 0.7–2)
LEUKOCYTE ESTERASE UR QL STRIP: NEGATIVE
LIPASE SERPL-CCNC: 19 U/L (ref 13–60)
LYMPHOCYTES # BLD AUTO: 2.2 10E3/UL (ref 0.8–5.3)
LYMPHOCYTES NFR BLD AUTO: 19 %
MCH RBC QN AUTO: 28.6 PG (ref 26.5–33)
MCHC RBC AUTO-ENTMCNC: 34.1 G/DL (ref 31.5–36.5)
MCV RBC AUTO: 84 FL (ref 78–100)
MONOCYTES # BLD AUTO: 1 10E3/UL (ref 0–1.3)
MONOCYTES NFR BLD AUTO: 9 %
NEUTROPHILS # BLD AUTO: 8.1 10E3/UL (ref 1.6–8.3)
NEUTROPHILS NFR BLD AUTO: 70 %
NITRATE UR QL: NEGATIVE
NRBC # BLD AUTO: 0 10E3/UL
NRBC BLD AUTO-RTO: 0 /100
PH UR STRIP: 5.5 [PH] (ref 5–7)
PLATELET # BLD AUTO: 283 10E3/UL (ref 150–450)
POTASSIUM SERPL-SCNC: 4.6 MMOL/L (ref 3.4–5.3)
PROT SERPL-MCNC: 7.2 G/DL (ref 6.4–8.3)
RBC # BLD AUTO: 5.1 10E6/UL (ref 4.4–5.9)
RBC URINE: 3 /HPF
SODIUM SERPL-SCNC: 138 MMOL/L (ref 135–145)
SP GR UR STRIP: 1 (ref 1–1.03)
SQUAMOUS EPITHELIAL: 2 /HPF
UROBILINOGEN UR STRIP-MCNC: NORMAL MG/DL
WBC # BLD AUTO: 11.5 10E3/UL (ref 4–11)
WBC URINE: 3 /HPF

## 2025-05-09 PROCEDURE — 250N000013 HC RX MED GY IP 250 OP 250 PS 637: Performed by: EMERGENCY MEDICINE

## 2025-05-09 PROCEDURE — 85025 COMPLETE CBC W/AUTO DIFF WBC: CPT | Performed by: EMERGENCY MEDICINE

## 2025-05-09 PROCEDURE — 87040 BLOOD CULTURE FOR BACTERIA: CPT | Performed by: EMERGENCY MEDICINE

## 2025-05-09 PROCEDURE — 96365 THER/PROPH/DIAG IV INF INIT: CPT | Mod: 59

## 2025-05-09 PROCEDURE — 250N000009 HC RX 250: Performed by: EMERGENCY MEDICINE

## 2025-05-09 PROCEDURE — 99285 EMERGENCY DEPT VISIT HI MDM: CPT | Mod: 25

## 2025-05-09 PROCEDURE — 36415 COLL VENOUS BLD VENIPUNCTURE: CPT | Performed by: EMERGENCY MEDICINE

## 2025-05-09 PROCEDURE — 96361 HYDRATE IV INFUSION ADD-ON: CPT

## 2025-05-09 PROCEDURE — 250N000011 HC RX IP 250 OP 636: Performed by: EMERGENCY MEDICINE

## 2025-05-09 PROCEDURE — 82565 ASSAY OF CREATININE: CPT | Performed by: EMERGENCY MEDICINE

## 2025-05-09 PROCEDURE — 83690 ASSAY OF LIPASE: CPT | Performed by: EMERGENCY MEDICINE

## 2025-05-09 PROCEDURE — 83605 ASSAY OF LACTIC ACID: CPT | Performed by: EMERGENCY MEDICINE

## 2025-05-09 PROCEDURE — 258N000003 HC RX IP 258 OP 636: Performed by: EMERGENCY MEDICINE

## 2025-05-09 PROCEDURE — 74177 CT ABD & PELVIS W/CONTRAST: CPT

## 2025-05-09 PROCEDURE — 81001 URINALYSIS AUTO W/SCOPE: CPT | Performed by: EMERGENCY MEDICINE

## 2025-05-09 PROCEDURE — 96375 TX/PRO/DX INJ NEW DRUG ADDON: CPT

## 2025-05-09 RX ORDER — SACCHAROMYCES BOULARDII 250 MG
250 CAPSULE ORAL 2 TIMES DAILY
Qty: 28 CAPSULE | Refills: 0 | Status: SHIPPED | OUTPATIENT
Start: 2025-05-09 | End: 2025-05-23

## 2025-05-09 RX ORDER — OXYCODONE HYDROCHLORIDE 5 MG/1
10 TABLET ORAL ONCE
Refills: 0 | Status: COMPLETED | OUTPATIENT
Start: 2025-05-09 | End: 2025-05-09

## 2025-05-09 RX ORDER — ONDANSETRON 2 MG/ML
4 INJECTION INTRAMUSCULAR; INTRAVENOUS ONCE
Status: COMPLETED | OUTPATIENT
Start: 2025-05-09 | End: 2025-05-09

## 2025-05-09 RX ORDER — IOPAMIDOL 755 MG/ML
500 INJECTION, SOLUTION INTRAVASCULAR ONCE
Status: COMPLETED | OUTPATIENT
Start: 2025-05-09 | End: 2025-05-09

## 2025-05-09 RX ORDER — HYDROCODONE BITARTRATE AND ACETAMINOPHEN 5; 325 MG/1; MG/1
1 TABLET ORAL ONCE
Refills: 0 | Status: COMPLETED | OUTPATIENT
Start: 2025-05-09 | End: 2025-05-09

## 2025-05-09 RX ORDER — HYDROCODONE BITARTRATE AND ACETAMINOPHEN 5; 325 MG/1; MG/1
1 TABLET ORAL EVERY 6 HOURS PRN
Qty: 18 TABLET | Refills: 0 | Status: SHIPPED | OUTPATIENT
Start: 2025-05-09 | End: 2025-05-14

## 2025-05-09 RX ORDER — PIPERACILLIN SODIUM, TAZOBACTAM SODIUM 4; .5 G/20ML; G/20ML
4.5 INJECTION, POWDER, LYOPHILIZED, FOR SOLUTION INTRAVENOUS ONCE
Status: COMPLETED | OUTPATIENT
Start: 2025-05-09 | End: 2025-05-09

## 2025-05-09 RX ADMIN — SODIUM CHLORIDE 65 ML: 9 INJECTION, SOLUTION INTRAVENOUS at 15:50

## 2025-05-09 RX ADMIN — HYDROCODONE BITARTRATE AND ACETAMINOPHEN 1 TABLET: 5; 325 TABLET ORAL at 16:42

## 2025-05-09 RX ADMIN — IOPAMIDOL 100 ML: 755 INJECTION, SOLUTION INTRAVENOUS at 15:50

## 2025-05-09 RX ADMIN — SODIUM CHLORIDE 2412 ML: 0.9 INJECTION, SOLUTION INTRAVENOUS at 15:14

## 2025-05-09 RX ADMIN — ONDANSETRON 4 MG: 2 INJECTION INTRAMUSCULAR; INTRAVENOUS at 15:33

## 2025-05-09 RX ADMIN — OXYCODONE HYDROCHLORIDE 10 MG: 5 TABLET ORAL at 15:08

## 2025-05-09 RX ADMIN — PIPERACILLIN AND TAZOBACTAM 4.5 G: 4; .5 INJECTION, POWDER, FOR SOLUTION INTRAVENOUS at 15:32

## 2025-05-09 ASSESSMENT — ACTIVITIES OF DAILY LIVING (ADL)
ADLS_ACUITY_SCORE: 50

## 2025-05-09 ASSESSMENT — COLUMBIA-SUICIDE SEVERITY RATING SCALE - C-SSRS
6. HAVE YOU EVER DONE ANYTHING, STARTED TO DO ANYTHING, OR PREPARED TO DO ANYTHING TO END YOUR LIFE?: NO
1. IN THE PAST MONTH, HAVE YOU WISHED YOU WERE DEAD OR WISHED YOU COULD GO TO SLEEP AND NOT WAKE UP?: NO
2. HAVE YOU ACTUALLY HAD ANY THOUGHTS OF KILLING YOURSELF IN THE PAST MONTH?: NO

## 2025-05-09 NOTE — ED TRIAGE NOTES
Pt arrives with LQU pain that started today, hx of diverticulitis, denies taking any medications for pain. States having a surgery 3 years ago for diverticulitis and hasn't had a flare up since, pain is very similar to past flare ups.      Triage Assessment (Adult)       Row Name 05/09/25 1418          Triage Assessment    Airway WDL WDL        Respiratory WDL    Respiratory WDL WDL        Cardiac WDL    Cardiac WDL WDL

## 2025-05-09 NOTE — DISCHARGE INSTRUCTIONS
Return to the ER for worsening pain, fever, vomiting, or any new concerns.    You should follow with your colorectal surgeon within the next week.  You will need a repeat colonoscopy to ensure there are no concerning changes inside the colon that remains after your surgery.    As we discussed you have a pulmonary nodule.  This needs to be followed by your primary care clinic.

## 2025-05-10 NOTE — ED PROVIDER NOTES
"  Emergency Department Note      History of Present Illness     Chief Complaint   Abdominal Pain      HPI   Pardeep Waite is a 44 year old male with a history of recurrent diverticulitis.  3 years ago he had a partial colectomy at [facility].  Since then he has not had any episodes of abdominal pain.  Over the last 48 hours he has begun to develop left-sided abdominal pain reminiscent of prior diverticulitis.  No fever.  No vomiting.  No chest pain, cough, shortness of breath.  He is moving his bowels normally and at his baseline.  He says his stools have been harder than usual since he had the partial colectomy.    Review of External Notes   Discharge summary reviewed from September 30, 2021 and the patient was admitted with sepsis due to diverticulitis.    Past Medical History     Medical History and Problem List   Past Medical History:   Diagnosis Date    Arthritis        Medications   amoxicillin-clavulanate (AUGMENTIN) 875-125 MG tablet  HYDROcodone-acetaminophen (NORCO) 5-325 MG tablet  saccharomyces boulardii (FLORASTOR) 250 MG capsule  albuterol (PROAIR HFA/PROVENTIL HFA/VENTOLIN HFA) 108 (90 Base) MCG/ACT inhaler  LORazepam (ATIVAN) 1 MG tablet  oxyCODONE-acetaminophen (PERCOCET) 5-325 MG tablet        Surgical History   Past Surgical History:   Procedure Laterality Date    COLONOSCOPY Left 8/27/2020    Procedure: COLONOSCOPY;  Surgeon: Magalie Zaragoza MD;  Location:  GI       Physical Exam     Patient Vitals for the past 24 hrs:   BP Temp Pulse Resp SpO2 Height Weight   05/09/25 1746 125/77 -- 87 18 99 % -- --   05/09/25 1534 -- -- -- -- 97 % -- --   05/09/25 1530 -- -- -- -- 98 % -- --   05/09/25 1450 -- -- -- -- -- 1.86 m (6' 1.23\") --   05/09/25 1449 -- -- -- -- -- 1.86 m (6' 1.23\") --   05/09/25 1421 -- 98.5  F (36.9  C) -- -- -- -- --   05/09/25 1420 (!) 146/102 -- (!) 122 18 97 % -- 128.3 kg (282 lb 13.6 oz)     Physical Exam  Constitutional:       General: He is not in acute " distress.     Appearance: Normal appearance. He is not toxic-appearing.   HENT:      Head: Atraumatic.   Eyes:      General: No scleral icterus.     Conjunctiva/sclera: Conjunctivae normal.   Cardiovascular:      Rate and Rhythm: Normal rate and regular rhythm.      Heart sounds: Normal heart sounds.   Pulmonary:      Effort: Pulmonary effort is normal. No respiratory distress.      Breath sounds: Normal breath sounds.   Abdominal:      Palpations: Abdomen is soft.      Comments: Mild tenderness to the left mid abdomen.  No guarding or rebound.  No distention.   Musculoskeletal:         General: No deformity.      Cervical back: Neck supple.   Skin:     General: Skin is warm.      Capillary Refill: Capillary refill takes less than 2 seconds.      Findings: No rash.   Neurological:      Mental Status: He is alert.   Psychiatric:         Mood and Affect: Mood normal.         Behavior: Behavior normal.           Diagnostics     Lab Results   Labs Ordered and Resulted from Time of ED Arrival to Time of ED Departure   CBC WITH PLATELETS AND DIFFERENTIAL - Abnormal       Result Value    WBC Count 11.5 (*)     RBC Count 5.10      Hemoglobin 14.6      Hematocrit 42.8      MCV 84      MCH 28.6      MCHC 34.1      RDW 12.5      Platelet Count 283      % Neutrophils 70      % Lymphocytes 19      % Monocytes 9      % Eosinophils 1      % Basophils 0      % Immature Granulocytes 1      NRBCs per 100 WBC 0      Absolute Neutrophils 8.1      Absolute Lymphocytes 2.2      Absolute Monocytes 1.0      Absolute Eosinophils 0.1      Absolute Basophils 0.0      Absolute Immature Granulocytes 0.1      Absolute NRBCs 0.0     COMPREHENSIVE METABOLIC PANEL - Normal    Sodium 138      Potassium 4.6      Carbon Dioxide (CO2) 23      Anion Gap 12      Urea Nitrogen 16.1      Creatinine 0.77      GFR Estimate >90      Calcium 9.4      Chloride 103      Glucose 97      Alkaline Phosphatase 84      AST 33      ALT 40      Protein Total 7.2       Albumin 4.4      Bilirubin Total 0.6     LACTIC ACID WHOLE BLOOD WITH 1X REPEAT IN 2 HR WHEN >2 - Normal    Lactic Acid, Initial 0.8     LIPASE - Normal    Lipase 19     BLOOD CULTURE   BLOOD CULTURE       Imaging   CT Abdomen Pelvis w Contrast   Final Result   IMPRESSION:    1.  Findings are compatible with acute diverticulitis involving the descending and proximal sigmoid colon. Follow-up colonoscopy should be considered after acute symptoms resolve as an underlying colonic mass cannot be excluded in the areas of colonic    wall thickening.   2.  Subtle reticular and groundglass opacities are seen within the lingula which appears slightly worsened since the prior exam. Findings may reflect worsening subsegmental atelectasis, scarring or atypical infection. Suggest clinical correlation. If    further clinical concern, suggest follow-up CT chest exam.   3.  4 mm subpleural nodule in the lingula was not seen previously. See follow-up guidelines below.      REFERENCE:   Guidelines for Management of Incidental Pulmonary Nodules Detected on CT Images: From the Fleischner Society 2017.    Guidelines apply to incidental nodules in patients who are 35 years or older.   Guidelines do not apply to lung cancer screening, patients with immunosuppression, or patients with known primary cancer.      SINGLE NODULE   Nodule size <6 mm   Low-risk patients: No follow-up needed.   High-risk patients: Optional follow-up at 12 months.      Nodule size 6-8 mm   Low-risk patients: Follow-up CT at 6-12 months, then consider CT at 18-24 months.   High-risk patients: Follow-up CT at 6-12 months, then at 18-24 months if no change.      Nodule size >8 mm   Either low or high-risk patients:   Consider CT, PET/CT, or tissue sampling at 3 months.             ED Course      Medications Administered   Medications   sodium chloride (PF) 0.9% PF flush 3 mL (has no administration in time range)   sodium chloride (PF) 0.9% PF flush 3 mL (  Intracatheter Canceled Entry 5/9/25 1533)   sodium chloride 0.9% BOLUS 2,412 mL (0 mLs Intravenous Stopped 5/9/25 1802)   piperacillin-tazobactam (ZOSYN) 4.5 g vial to attach to  mL bag (0 g Intravenous Stopped 5/9/25 1618)   oxyCODONE (ROXICODONE) tablet 10 mg (10 mg Oral $Given 5/9/25 1508)   ondansetron (ZOFRAN) injection 4 mg (4 mg Intravenous $Given 5/9/25 1533)   sodium chloride 0.9 % bag for CT scan flush (65 mLs Intravenous $Given 5/9/25 1550)   iopamidol (ISOVUE-370) solution 500 mL (100 mLs Intravenous $Given 5/9/25 1550)   HYDROcodone-acetaminophen (NORCO) 5-325 MG per tablet 1 tablet (1 tablet Oral $Given 5/9/25 1642)       Medical Decision Making / Diagnosis     NITZA   Pardeep Waite is a 44 year old male who presents to the ED for evaluation of abdominal pain.  He does appear to have recurrent diverticulitis despite having had a prior partial colectomy.  No evidence of perforation or abscess.  Her it was elevated on arrival but this improved with treatment here.  There is no indication for hospital mission.  We did discuss that he needs to return immediately if he has any worsening pain or vomiting or fever.  He agrees to this.  He is advised to follow-up with his colorectal surgeon.  Radiology notes that the patient likely will need repeat colonoscopy.  No clear malignant signs but this needs to be ruled out.  This was discussed with the patient.  We discussed the findings of likely atelectasis or scarring as well as a pulmonary nodule.  He says he has minimal smoking history.  We discussed the potential malignant changes of the nodule and the need for close follow-up through his primary care clinic.    Disposition   The patient was discharged.     Diagnosis     ICD-10-CM    1. Diverticulitis  K57.92       2. Pulmonary nodule  R91.1            Discharge Medications   Discharge Medication List as of 5/9/2025  6:02 PM        START taking these medications    Details   amoxicillin-clavulanate  (AUGMENTIN) 875-125 MG tablet Take 1 tablet by mouth 2 times daily for 10 days., Disp-20 tablet, R-0, E-Prescribe      saccharomyces boulardii (FLORASTOR) 250 MG capsule Take 1 capsule (250 mg) by mouth 2 times daily for 14 days., Disp-28 capsule, R-0, E-Prescribe                     Davie Quiros MD  05/09/25 1929

## 2025-05-14 LAB
BACTERIA SPEC CULT: NO GROWTH
BACTERIA SPEC CULT: NO GROWTH

## (undated) DEVICE — KIT ENDO TURNOVER/PROCEDURE W/CLEAN A SCOPE LINERS 103888

## (undated) RX ORDER — SIMETHICONE 40MG/0.6ML
SUSPENSION, DROPS(FINAL DOSAGE FORM)(ML) ORAL
Status: DISPENSED
Start: 2020-08-27

## (undated) RX ORDER — FENTANYL CITRATE 50 UG/ML
INJECTION, SOLUTION INTRAMUSCULAR; INTRAVENOUS
Status: DISPENSED
Start: 2020-08-27